# Patient Record
Sex: FEMALE | Race: WHITE | Employment: UNEMPLOYED | URBAN - NONMETROPOLITAN AREA
[De-identification: names, ages, dates, MRNs, and addresses within clinical notes are randomized per-mention and may not be internally consistent; named-entity substitution may affect disease eponyms.]

---

## 2018-05-14 LAB
ABO/RH: NORMAL
ANTIBODY SCREEN: NEGATIVE
CHLAMYDIA TRACHOMATIS AMPLIFIED DET: NEGATIVE
HCT: 36.8 %
HEPATITIS B SURFACE ANTIGEN INTERPRETATION: NEGATIVE
HGB: 12.1
HIV-1 AND HIV-2 ANTIBODIES: NEGATIVE
N GONORRHOEAE AMPLIFIED DET: NEGATIVE
RPR: NORMAL
RUBELLA ANTIBODY IGG: NORMAL

## 2018-10-01 LAB
GLUCOSE TOLERANCE SCREEN 50G: 97
HCT: 32.3 %
HGB: 10

## 2018-10-15 ENCOUNTER — INITIAL PRENATAL (OUTPATIENT)
Dept: OBGYN | Age: 23
End: 2018-10-15
Payer: COMMERCIAL

## 2018-10-15 VITALS
SYSTOLIC BLOOD PRESSURE: 121 MMHG | DIASTOLIC BLOOD PRESSURE: 83 MMHG | HEIGHT: 67 IN | BODY MASS INDEX: 32.33 KG/M2 | HEART RATE: 94 BPM | WEIGHT: 206 LBS

## 2018-10-15 DIAGNOSIS — R12 HEARTBURN DURING PREGNANCY IN THIRD TRIMESTER: ICD-10-CM

## 2018-10-15 DIAGNOSIS — Z34.80 ENCOUNTER FOR SUPERVISION OF NORMAL PREGNANCY IN MULTIGRAVIDA: Primary | ICD-10-CM

## 2018-10-15 DIAGNOSIS — O26.893 HEARTBURN DURING PREGNANCY IN THIRD TRIMESTER: ICD-10-CM

## 2018-10-15 PROBLEM — O26.899 HEARTBURN DURING PREGNANCY: Status: ACTIVE | Noted: 2018-10-15

## 2018-10-15 PROCEDURE — 99213 OFFICE O/P EST LOW 20 MIN: CPT | Performed by: ADVANCED PRACTICE MIDWIFE

## 2018-10-15 NOTE — PATIENT INSTRUCTIONS
sometimes follow orgasm. Learn about  labor  · Watch for signs of  labor. You may be going into labor if:  ¨ You have menstrual-like cramps, with or without nausea. ¨ You have about 6 or more contractions in 1 hour, even after you have had a glass of water and are resting. ¨ You have a low, dull backache that does not go away when you change your position. ¨ You have pain or pressure in your pelvis that comes and goes in a pattern. ¨ You have intestinal cramping or flu-like symptoms, with or without diarrhea. ¨ You notice an increase or change in your vaginal discharge. Discharge may be heavy, mucus-like, watery, or streaked with blood. ¨ Your water breaks. · If you think you have  labor:  ¨ Drink 2 or 3 glasses of water or juice. Not drinking enough fluids can cause contractions. ¨ Stop what you are doing, and empty your bladder. Then lie down on your left side for at least 1 hour. ¨ While lying on your side, find your breast bone. Put your fingers in the soft spot just below it. Move your fingers down toward your belly button to find the top of your uterus. Check to see if it is tight. ¨ Contractions can be weak or strong. Record your contractions for an hour. Time a contraction from the start of one contraction to the start of the next one. ¨ Single or several strong contractions without a pattern are called Bannock-Perez contractions. They are practice contractions but not the start of labor. They often stop if you change what you are doing. ¨ Call your doctor if you have regular contractions. Where can you learn more? Go to https://City Invoice Financechino."Small World Kids, Inc.". org and sign in to your Freepath account. Enter F544 in the Fundamo (Proprietary) box to learn more about \"Weeks 26 to 30 of Your Pregnancy: Care Instructions. \"     If you do not have an account, please click on the \"Sign Up Now\" link.   Current as of: 2017  Content Version: 11.7  © 7040-9681 Healthwise,

## 2018-11-01 ENCOUNTER — ROUTINE PRENATAL (OUTPATIENT)
Dept: OBGYN | Age: 23
End: 2018-11-01
Payer: COMMERCIAL

## 2018-11-01 VITALS
DIASTOLIC BLOOD PRESSURE: 79 MMHG | SYSTOLIC BLOOD PRESSURE: 115 MMHG | HEART RATE: 97 BPM | BODY MASS INDEX: 32.42 KG/M2 | WEIGHT: 207 LBS

## 2018-11-01 DIAGNOSIS — Z34.80 ENCOUNTER FOR SUPERVISION OF NORMAL PREGNANCY IN MULTIGRAVIDA: Primary | ICD-10-CM

## 2018-11-01 PROCEDURE — 99213 OFFICE O/P EST LOW 20 MIN: CPT | Performed by: ADVANCED PRACTICE MIDWIFE

## 2018-11-01 NOTE — PATIENT INSTRUCTIONS
have counted 10 movements, write down your stop time. ¨ Write down how many minutes it took for your baby to move 10 times. ¨ If an hour goes by and you have not recorded 10 movements, have something to eat or drink and then count for another hour. If you do not record 10 movements in either hour, call your doctor. Ease heartburn  · Eat small, frequent meals. · Do not eat chocolate, peppermint, or very spicy foods. Avoid drinks with caffeine, such as coffee, tea, and sodas. · Avoid bending over or lying down after meals. · Talk a short walk after you eat. · If heartburn is a problem at night, do not eat for 2 hours before bedtime. · Take antacids like Mylanta, Maalox, Rolaids, or Tums. Do not take antacids that have sodium bicarbonate. Care for varicose veins  · Varicose veins are blood vessels that stretch out with the extra blood during pregnancy. Your legs may ache or throb. Most varicose veins will go away after the birth. · Avoid standing for long periods of time. Sit with your legs crossed at the ankles, not the knees. · Sit with your feet propped up. · Avoid tight clothing or stockings. Wear support hose. · Exercise regularly. Try walking for at least 30 minutes a day. Where can you learn more? Go to https://Oberon Fuels.Phoenix Books. org and sign in to your [employer] Cambridge Education [employer] account. Enter Y064 in the [facility] box to learn more about \"Weeks 30 to 32 of Your Pregnancy: Care Instructions. \"     If you do not have an account, please click on the \"Sign Up Now\" link. Current as of: November 21, 2017  Content Version: 11.7  © 6346-2213 Rocket Fuel. Care instructions adapted under license by [facility] ([facility]). If you have questions about a medical condition or this instruction, always ask your healthcare professional. [address]  any warranty or liability for your use of this information.

## 2018-11-12 ENCOUNTER — ROUTINE PRENATAL (OUTPATIENT)
Dept: OBGYN | Age: 23
End: 2018-11-12
Payer: COMMERCIAL

## 2018-11-12 ENCOUNTER — HOSPITAL ENCOUNTER (OUTPATIENT)
Age: 23
Setting detail: OBSERVATION
Discharge: HOME | End: 2018-11-12
Attending: ADVANCED PRACTICE MIDWIFE | Admitting: ADVANCED PRACTICE MIDWIFE
Payer: COMMERCIAL

## 2018-11-12 VITALS
BODY MASS INDEX: 31.95 KG/M2 | WEIGHT: 204 LBS | HEART RATE: 109 BPM | DIASTOLIC BLOOD PRESSURE: 77 MMHG | SYSTOLIC BLOOD PRESSURE: 118 MMHG

## 2018-11-12 VITALS
WEIGHT: 206 LBS | HEIGHT: 67 IN | DIASTOLIC BLOOD PRESSURE: 80 MMHG | SYSTOLIC BLOOD PRESSURE: 120 MMHG | BODY MASS INDEX: 32.33 KG/M2 | RESPIRATION RATE: 16 BRPM | HEART RATE: 93 BPM | TEMPERATURE: 98.8 F

## 2018-11-12 DIAGNOSIS — N89.8 VAGINAL DISCHARGE: ICD-10-CM

## 2018-11-12 DIAGNOSIS — Z3A.32 32 WEEKS GESTATION OF PREGNANCY: ICD-10-CM

## 2018-11-12 DIAGNOSIS — Z34.03 SUPERVISION OF NORMAL FIRST PREGNANCY IN THIRD TRIMESTER: Primary | ICD-10-CM

## 2018-11-12 LAB
BACTERIA WET PREP: NORMAL
CLUE CELLS: NORMAL
EPITHELIAL CELLS WET PREP: NORMAL
RBC WET PREP: NORMAL
SOURCE WET PREP: NORMAL
TRICHOMONAS PREP: NORMAL
WBC WET PREP: NORMAL
YEAST WET PREP: NORMAL

## 2018-11-12 PROCEDURE — 99213 OFFICE O/P EST LOW 20 MIN: CPT | Performed by: ADVANCED PRACTICE MIDWIFE

## 2018-11-12 PROCEDURE — G0379 DIRECT REFER HOSPITAL OBSERV: HCPCS

## 2018-11-12 PROCEDURE — G0378 HOSPITAL OBSERVATION PER HR: HCPCS

## 2018-11-12 RX ORDER — PANTOPRAZOLE SODIUM 20 MG/1
TABLET, DELAYED RELEASE ORAL
Refills: 2 | COMMUNITY
Start: 2018-09-10 | End: 2019-01-16

## 2018-11-12 RX ORDER — SODIUM CHLORIDE 0.9 % (FLUSH) 0.9 %
10 SYRINGE (ML) INJECTION EVERY 12 HOURS SCHEDULED
Status: DISCONTINUED | OUTPATIENT
Start: 2018-11-12 | End: 2018-11-12 | Stop reason: HOSPADM

## 2018-11-12 RX ORDER — ACETAMINOPHEN 325 MG/1
650 TABLET ORAL EVERY 4 HOURS PRN
Status: DISCONTINUED | OUTPATIENT
Start: 2018-11-12 | End: 2018-11-12 | Stop reason: HOSPADM

## 2018-11-12 RX ORDER — SODIUM CHLORIDE 0.9 % (FLUSH) 0.9 %
10 SYRINGE (ML) INJECTION PRN
Status: DISCONTINUED | OUTPATIENT
Start: 2018-11-12 | End: 2018-11-12 | Stop reason: HOSPADM

## 2018-11-12 RX ORDER — ONDANSETRON 2 MG/ML
4 INJECTION INTRAMUSCULAR; INTRAVENOUS EVERY 6 HOURS PRN
Status: DISCONTINUED | OUTPATIENT
Start: 2018-11-12 | End: 2018-11-12 | Stop reason: HOSPADM

## 2018-11-12 NOTE — PROGRESS NOTES
Amy presents as walk in for \"leaking fluid\". Neg speculum exam, neg amniosure, +wet prep collected. She was sent to [facility] for monitoring due to fetal tachycardia. She was also asked to keep her f/u appointment on Thursday of this week. All questions answered and reassurance given.

## 2018-11-15 ENCOUNTER — ROUTINE PRENATAL (OUTPATIENT)
Dept: OBGYN | Age: 23
End: 2018-11-15
Payer: COMMERCIAL

## 2018-11-15 VITALS
BODY MASS INDEX: 32.11 KG/M2 | DIASTOLIC BLOOD PRESSURE: 78 MMHG | SYSTOLIC BLOOD PRESSURE: 116 MMHG | WEIGHT: 205 LBS | HEART RATE: 119 BPM

## 2018-11-15 DIAGNOSIS — O26.893 HEARTBURN DURING PREGNANCY IN THIRD TRIMESTER: ICD-10-CM

## 2018-11-15 DIAGNOSIS — R12 HEARTBURN DURING PREGNANCY IN THIRD TRIMESTER: ICD-10-CM

## 2018-11-15 DIAGNOSIS — O99.213 OBESITY AFFECTING PREGNANCY IN THIRD TRIMESTER: Primary | ICD-10-CM

## 2018-11-15 PROBLEM — Z3A.32 32 WEEKS GESTATION OF PREGNANCY: Status: RESOLVED | Noted: 2018-11-12 | Resolved: 2018-11-15

## 2018-11-15 PROCEDURE — 99213 OFFICE O/P EST LOW 20 MIN: CPT | Performed by: ADVANCED PRACTICE MIDWIFE

## 2018-11-15 NOTE — PROGRESS NOTES
Viraj Eduardo is here for a return obstetrical visit. Today she is 32w3d weeks EGA. She is doing well and has no complaints. Precautions reviewed. Continue with routine prenatal care.

## 2018-11-24 ENCOUNTER — HOSPITAL ENCOUNTER (OUTPATIENT)
Age: 23
Setting detail: OBSERVATION
Discharge: HOME | End: 2018-11-24
Attending: ADVANCED PRACTICE MIDWIFE | Admitting: ADVANCED PRACTICE MIDWIFE
Payer: COMMERCIAL

## 2018-11-24 VITALS
WEIGHT: 206 LBS | SYSTOLIC BLOOD PRESSURE: 119 MMHG | DIASTOLIC BLOOD PRESSURE: 82 MMHG | HEART RATE: 102 BPM | HEIGHT: 67 IN | BODY MASS INDEX: 32.33 KG/M2 | TEMPERATURE: 98 F

## 2018-11-24 PROBLEM — R52 PAIN: Status: ACTIVE | Noted: 2018-11-24

## 2018-11-24 LAB
BACTERIA: ABNORMAL /HPF
BILIRUBIN URINE: NEGATIVE
BLOOD, URINE: NEGATIVE
CLARITY: ABNORMAL
COLOR: YELLOW
EPITHELIAL CELLS, UA: 26 /HPF (ref 0–5)
GLUCOSE URINE: NEGATIVE MG/DL
HYALINE CASTS: 10 /HPF (ref 0–8)
KETONES, URINE: NEGATIVE MG/DL
LEUKOCYTE ESTERASE, URINE: ABNORMAL
NITRITE, URINE: NEGATIVE
PH UA: 7
PROTEIN UA: NEGATIVE MG/DL
RBC UA: 1 /HPF (ref 0–4)
SPECIFIC GRAVITY UA: 1.02
UROBILINOGEN, URINE: 1 E.U./DL
WBC UA: 7 /HPF (ref 0–5)

## 2018-11-24 PROCEDURE — G0379 DIRECT REFER HOSPITAL OBSERV: HCPCS

## 2018-11-24 PROCEDURE — G0378 HOSPITAL OBSERVATION PER HR: HCPCS

## 2018-11-24 PROCEDURE — 96372 THER/PROPH/DIAG INJ SC/IM: CPT

## 2018-11-24 PROCEDURE — 81001 URINALYSIS AUTO W/SCOPE: CPT

## 2018-11-24 PROCEDURE — 2500000003 HC RX 250 WO HCPCS: Performed by: ADVANCED PRACTICE MIDWIFE

## 2018-11-24 PROCEDURE — 6360000002 HC RX W HCPCS: Performed by: ADVANCED PRACTICE MIDWIFE

## 2018-11-24 RX ORDER — LIDOCAINE HYDROCHLORIDE 10 MG/ML
2.1 INJECTION, SOLUTION EPIDURAL; INFILTRATION; INTRACAUDAL; PERINEURAL ONCE
Status: COMPLETED | OUTPATIENT
Start: 2018-11-24 | End: 2018-11-24

## 2018-11-24 RX ORDER — ONDANSETRON 2 MG/ML
4 INJECTION INTRAMUSCULAR; INTRAVENOUS EVERY 6 HOURS PRN
Status: DISCONTINUED | OUTPATIENT
Start: 2018-11-24 | End: 2018-11-24 | Stop reason: HOSPADM

## 2018-11-24 RX ORDER — CEFTRIAXONE 1 G/1
1 INJECTION, POWDER, FOR SOLUTION INTRAMUSCULAR; INTRAVENOUS ONCE
Status: COMPLETED | OUTPATIENT
Start: 2018-11-24 | End: 2018-11-24

## 2018-11-24 RX ORDER — ACETAMINOPHEN 325 MG/1
650 TABLET ORAL EVERY 4 HOURS PRN
Status: DISCONTINUED | OUTPATIENT
Start: 2018-11-24 | End: 2018-11-24 | Stop reason: HOSPADM

## 2018-11-24 RX ORDER — CEFTRIAXONE 1 G/1
1 INJECTION, POWDER, FOR SOLUTION INTRAMUSCULAR; INTRAVENOUS ONCE
Status: DISCONTINUED | OUTPATIENT
Start: 2018-11-24 | End: 2018-11-24 | Stop reason: CLARIF

## 2018-11-24 RX ADMIN — CEFTRIAXONE 1 G: 1 INJECTION, POWDER, FOR SOLUTION INTRAMUSCULAR; INTRAVENOUS at 13:39

## 2018-11-24 RX ADMIN — LIDOCAINE HYDROCHLORIDE 2.1 ML: 10 INJECTION, SOLUTION EPIDURAL; INFILTRATION; INTRACAUDAL; PERINEURAL at 13:40

## 2018-11-29 ENCOUNTER — ROUTINE PRENATAL (OUTPATIENT)
Dept: OBGYN | Age: 23
End: 2018-11-29
Payer: COMMERCIAL

## 2018-11-29 ENCOUNTER — HOSPITAL ENCOUNTER (OUTPATIENT)
Dept: ULTRASOUND IMAGING | Age: 23
Discharge: HOME | End: 2018-11-29
Payer: COMMERCIAL

## 2018-11-29 VITALS
SYSTOLIC BLOOD PRESSURE: 126 MMHG | HEART RATE: 121 BPM | WEIGHT: 209 LBS | DIASTOLIC BLOOD PRESSURE: 84 MMHG | BODY MASS INDEX: 32.73 KG/M2

## 2018-11-29 DIAGNOSIS — R12 HEARTBURN DURING PREGNANCY IN THIRD TRIMESTER: ICD-10-CM

## 2018-11-29 DIAGNOSIS — O26.893 HEARTBURN DURING PREGNANCY IN THIRD TRIMESTER: ICD-10-CM

## 2018-11-29 DIAGNOSIS — Z34.03 SUPERVISION OF NORMAL FIRST PREGNANCY IN THIRD TRIMESTER: ICD-10-CM

## 2018-11-29 DIAGNOSIS — O99.213 OBESITY AFFECTING PREGNANCY IN THIRD TRIMESTER: ICD-10-CM

## 2018-11-29 DIAGNOSIS — O99.213 OBESITY AFFECTING PREGNANCY IN THIRD TRIMESTER: Primary | ICD-10-CM

## 2018-11-29 PROCEDURE — 99213 OFFICE O/P EST LOW 20 MIN: CPT | Performed by: ADVANCED PRACTICE MIDWIFE

## 2018-11-29 PROCEDURE — 76816 OB US FOLLOW-UP PER FETUS: CPT

## 2018-11-29 NOTE — PROGRESS NOTES
Katy Carrion is here for a return obstetrical visit. Today she is 34w3d weeks EGA. She is doing well and has no complaints. She  does not have vaginal bleeding, leaking of fluid, contractions. She does not have blurred vision, SOB, or increased swelling in legs or face. Pt does feel fetal movement regularly. EFW today 6#1. O:   Vitals:    11/29/18 1446   BP: 126/84   Pulse: 121   Weight: 209 lb (94.8 kg)     Pt is A&Ox3, in no acute distress. Normocephalic, atraumatic. PERRL. Resp even and non-labored. Skin pink, warm & dry. Gravid abdomen. FRITZ's well. Gait steady. See OB flowsheet. A: Normal IUP at 34w3d wks      Diagnosis Orders   1. Obesity affecting pregnancy in third trimester     2. Supervision of normal first pregnancy in third trimester         P:   Pt counseled on [address], warning signs, PTL, Preeclampsia  Continue with routine prenatal care. RTC in 2 wk for prenatal visit  GBS next visit    MEDICATIONS:  No orders of the defined types were placed in this encounter. ORDERS:  No orders of the defined types were placed in this encounter.

## 2018-12-13 ENCOUNTER — ROUTINE PRENATAL (OUTPATIENT)
Dept: OBGYN | Age: 23
End: 2018-12-13
Payer: COMMERCIAL

## 2018-12-13 VITALS
DIASTOLIC BLOOD PRESSURE: 81 MMHG | WEIGHT: 216 LBS | BODY MASS INDEX: 33.83 KG/M2 | SYSTOLIC BLOOD PRESSURE: 130 MMHG | HEART RATE: 124 BPM

## 2018-12-13 DIAGNOSIS — Z36.85 ANTENATAL SCREENING FOR STREPTOCOCCUS B: ICD-10-CM

## 2018-12-13 DIAGNOSIS — Z3A.36 36 WEEKS GESTATION OF PREGNANCY: ICD-10-CM

## 2018-12-13 DIAGNOSIS — M53.3 ACUTE COCCYGEAL PAIN: ICD-10-CM

## 2018-12-13 DIAGNOSIS — Z34.03 SUPERVISION OF NORMAL FIRST PREGNANCY IN THIRD TRIMESTER: Primary | ICD-10-CM

## 2018-12-13 PROCEDURE — 99213 OFFICE O/P EST LOW 20 MIN: CPT | Performed by: ADVANCED PRACTICE MIDWIFE

## 2018-12-16 LAB — GROUP B STREP CULTURE: NORMAL

## 2018-12-20 ENCOUNTER — ROUTINE PRENATAL (OUTPATIENT)
Dept: OBGYN | Age: 23
End: 2018-12-20
Payer: COMMERCIAL

## 2018-12-20 VITALS
HEART RATE: 116 BPM | DIASTOLIC BLOOD PRESSURE: 81 MMHG | SYSTOLIC BLOOD PRESSURE: 133 MMHG | BODY MASS INDEX: 33.83 KG/M2 | WEIGHT: 216 LBS

## 2018-12-20 DIAGNOSIS — Z34.03 SUPERVISION OF NORMAL FIRST PREGNANCY IN THIRD TRIMESTER: ICD-10-CM

## 2018-12-20 DIAGNOSIS — Z71.89 ENCOUNTER FOR ANTEPARTUM CONSULTATION REGARDING LACTATION: Primary | ICD-10-CM

## 2018-12-20 DIAGNOSIS — M53.3 ACUTE COCCYGEAL PAIN: ICD-10-CM

## 2018-12-20 PROCEDURE — G8484 FLU IMMUNIZE NO ADMIN: HCPCS | Performed by: ADVANCED PRACTICE MIDWIFE

## 2018-12-20 PROCEDURE — G8417 CALC BMI ABV UP PARAM F/U: HCPCS | Performed by: ADVANCED PRACTICE MIDWIFE

## 2018-12-20 PROCEDURE — 1036F TOBACCO NON-USER: CPT | Performed by: ADVANCED PRACTICE MIDWIFE

## 2018-12-20 PROCEDURE — 99213 OFFICE O/P EST LOW 20 MIN: CPT | Performed by: ADVANCED PRACTICE MIDWIFE

## 2018-12-20 PROCEDURE — G8427 DOCREV CUR MEDS BY ELIG CLIN: HCPCS | Performed by: ADVANCED PRACTICE MIDWIFE

## 2018-12-20 RX ORDER — CYCLOBENZAPRINE HCL 5 MG
TABLET ORAL
Refills: 1 | COMMUNITY
Start: 2018-11-30 | End: 2019-01-16

## 2018-12-24 PROBLEM — R52 PAIN: Status: RESOLVED | Noted: 2018-11-24 | Resolved: 2018-12-24

## 2018-12-26 ENCOUNTER — ROUTINE PRENATAL (OUTPATIENT)
Dept: OBGYN | Age: 23
End: 2018-12-26
Payer: COMMERCIAL

## 2018-12-26 VITALS
WEIGHT: 215 LBS | BODY MASS INDEX: 33.67 KG/M2 | HEART RATE: 87 BPM | DIASTOLIC BLOOD PRESSURE: 84 MMHG | SYSTOLIC BLOOD PRESSURE: 136 MMHG

## 2018-12-26 DIAGNOSIS — M53.3 ACUTE COCCYGEAL PAIN: ICD-10-CM

## 2018-12-26 DIAGNOSIS — Z34.03 SUPERVISION OF NORMAL FIRST PREGNANCY IN THIRD TRIMESTER: Primary | ICD-10-CM

## 2018-12-26 PROCEDURE — G8417 CALC BMI ABV UP PARAM F/U: HCPCS | Performed by: ADVANCED PRACTICE MIDWIFE

## 2018-12-26 PROCEDURE — G8484 FLU IMMUNIZE NO ADMIN: HCPCS | Performed by: ADVANCED PRACTICE MIDWIFE

## 2018-12-26 PROCEDURE — G8427 DOCREV CUR MEDS BY ELIG CLIN: HCPCS | Performed by: ADVANCED PRACTICE MIDWIFE

## 2018-12-26 PROCEDURE — 1036F TOBACCO NON-USER: CPT | Performed by: ADVANCED PRACTICE MIDWIFE

## 2018-12-26 PROCEDURE — 99213 OFFICE O/P EST LOW 20 MIN: CPT | Performed by: ADVANCED PRACTICE MIDWIFE

## 2018-12-26 NOTE — PATIENT INSTRUCTIONS
[email or web address] and sign in to your Solar Site Design account. Enter B044 in the [facility] box to learn more about \"Week 38 of Your Pregnancy: Care Instructions. \"     If you do not have an account, please click on the \"Sign Up Now\" link. Current as of: November 21, 2017  Content Version: 11.8  © 4841-9427 Healthwise, [place]. Care instructions adapted under license by [facility] ([facility]). If you have questions about a medical condition or this instruction, always ask your healthcare professional. [place] [address] any warranty or liability for your use of this information.

## 2018-12-28 PROBLEM — Z71.89 ENCOUNTER FOR ANTEPARTUM CONSULTATION REGARDING LACTATION: Status: RESOLVED | Noted: 2018-12-20 | Resolved: 2018-12-28

## 2018-12-31 ENCOUNTER — ROUTINE PRENATAL (OUTPATIENT)
Dept: OBGYN | Age: 23
End: 2018-12-31
Payer: COMMERCIAL

## 2018-12-31 VITALS
HEART RATE: 131 BPM | BODY MASS INDEX: 33.99 KG/M2 | SYSTOLIC BLOOD PRESSURE: 121 MMHG | DIASTOLIC BLOOD PRESSURE: 86 MMHG | WEIGHT: 217 LBS

## 2018-12-31 DIAGNOSIS — Z34.80 ENCOUNTER FOR SUPERVISION OF NORMAL PREGNANCY IN MULTIGRAVIDA: ICD-10-CM

## 2018-12-31 DIAGNOSIS — M53.3 ACUTE COCCYGEAL PAIN: Primary | ICD-10-CM

## 2018-12-31 DIAGNOSIS — R12 HEARTBURN DURING PREGNANCY IN THIRD TRIMESTER: ICD-10-CM

## 2018-12-31 DIAGNOSIS — O26.893 HEARTBURN DURING PREGNANCY IN THIRD TRIMESTER: ICD-10-CM

## 2018-12-31 DIAGNOSIS — O99.213 OBESITY AFFECTING PREGNANCY IN THIRD TRIMESTER: ICD-10-CM

## 2018-12-31 PROCEDURE — G8427 DOCREV CUR MEDS BY ELIG CLIN: HCPCS | Performed by: ADVANCED PRACTICE MIDWIFE

## 2018-12-31 PROCEDURE — 99213 OFFICE O/P EST LOW 20 MIN: CPT | Performed by: ADVANCED PRACTICE MIDWIFE

## 2018-12-31 PROCEDURE — 1036F TOBACCO NON-USER: CPT | Performed by: ADVANCED PRACTICE MIDWIFE

## 2018-12-31 PROCEDURE — G8484 FLU IMMUNIZE NO ADMIN: HCPCS | Performed by: ADVANCED PRACTICE MIDWIFE

## 2018-12-31 PROCEDURE — G8417 CALC BMI ABV UP PARAM F/U: HCPCS | Performed by: ADVANCED PRACTICE MIDWIFE

## 2018-12-31 NOTE — LETTER
reasonable alternatives and risks of each alternative for delivery. The possible risks of refusing medically indicated delivery, including death of myself and/or my baby have been explained to me. Patient Signature_________________________ Date ____________ Time__________    [place] _________________________ Date ___________ Time_________    Provider Attestation  I have discussed with the patient risks, benefits and alternatives (and relevant risks, benefits, and side effects related to alternative or not receiving care), and likelihood of the success of the delivery. Separate surgical consent form will be completed prior to  section.      Provider Signature ________________________ Date ____________ Time__________

## 2019-01-01 ENCOUNTER — APPOINTMENT (OUTPATIENT)
Dept: LABOR AND DELIVERY | Age: 24
End: 2019-01-01
Payer: COMMERCIAL

## 2019-01-01 ENCOUNTER — HOSPITAL ENCOUNTER (INPATIENT)
Age: 24
LOS: 2 days | Discharge: HOME | End: 2019-01-03
Attending: ADVANCED PRACTICE MIDWIFE | Admitting: ADVANCED PRACTICE MIDWIFE
Payer: COMMERCIAL

## 2019-01-01 PROBLEM — Z37.9 NORMAL LABOR: Status: ACTIVE | Noted: 2019-01-01

## 2019-01-01 LAB
ABO/RH: NORMAL
AMPHETAMINE SCREEN, URINE: NEGATIVE
ANTIBODY SCREEN: NORMAL
BACTERIA: NORMAL /HPF
BARBITURATE SCREEN URINE: NEGATIVE
BENZODIAZEPINE SCREEN, URINE: NEGATIVE
BILIRUBIN URINE: NEGATIVE
BLOOD, URINE: NEGATIVE
CANNABINOID SCREEN URINE: NEGATIVE
CLARITY: ABNORMAL
COCAINE METABOLITE SCREEN URINE: NEGATIVE
COLOR: ABNORMAL
EPITHELIAL CELLS, UA: NORMAL /HPF
GLUCOSE URINE: NEGATIVE MG/DL
HCT VFR BLD CALC: 29.8 % (ref 37–47)
HEMOGLOBIN: 8.7 G/DL (ref 12–16)
KETONES, URINE: NEGATIVE MG/DL
LEUKOCYTE ESTERASE, URINE: ABNORMAL
Lab: NORMAL
MCH RBC QN AUTO: 23.8 PG (ref 27–31)
MCHC RBC AUTO-ENTMCNC: 29.2 G/DL (ref 33–37)
MCV RBC AUTO: 81.6 FL (ref 81–99)
NITRITE, URINE: NEGATIVE
OPIATE SCREEN URINE: NEGATIVE
PDW BLD-RTO: 16.7 % (ref 11.5–14.5)
PH UA: 6
PLATELET # BLD: 272 K/UL (ref 130–400)
PMV BLD AUTO: 11.8 FL (ref 9.4–12.3)
PROTEIN UA: 30 MG/DL
RBC # BLD: 3.65 M/UL (ref 4.2–5.4)
RBC UA: NORMAL /HPF (ref 0–2)
SPECIFIC GRAVITY UA: 1.02
UROBILINOGEN, URINE: 0.2 E.U./DL
WBC # BLD: 9.2 K/UL (ref 4.8–10.8)
WBC UA: NORMAL /HPF (ref 0–5)

## 2019-01-01 PROCEDURE — 86592 SYPHILIS TEST NON-TREP QUAL: CPT

## 2019-01-01 PROCEDURE — 84112 EVAL AMNIOTIC FLUID PROTEIN: CPT

## 2019-01-01 PROCEDURE — 86901 BLOOD TYPING SEROLOGIC RH(D): CPT

## 2019-01-01 PROCEDURE — 2580000003 HC RX 258: Performed by: ADVANCED PRACTICE MIDWIFE

## 2019-01-01 PROCEDURE — 1220000000 HC SEMI PRIVATE OB R&B

## 2019-01-01 PROCEDURE — 6360000002 HC RX W HCPCS: Performed by: ADVANCED PRACTICE MIDWIFE

## 2019-01-01 PROCEDURE — 86900 BLOOD TYPING SEROLOGIC ABO: CPT

## 2019-01-01 PROCEDURE — 36415 COLL VENOUS BLD VENIPUNCTURE: CPT

## 2019-01-01 PROCEDURE — 86850 RBC ANTIBODY SCREEN: CPT

## 2019-01-01 PROCEDURE — 81001 URINALYSIS AUTO W/SCOPE: CPT

## 2019-01-01 PROCEDURE — 3E0P7VZ INTRODUCTION OF HORMONE INTO FEMALE REPRODUCTIVE, VIA NATURAL OR ARTIFICIAL OPENING: ICD-10-PCS | Performed by: ADVANCED PRACTICE MIDWIFE

## 2019-01-01 PROCEDURE — 80307 DRUG TEST PRSMV CHEM ANLYZR: CPT

## 2019-01-01 PROCEDURE — 85027 COMPLETE CBC AUTOMATED: CPT

## 2019-01-01 PROCEDURE — 6370000000 HC RX 637 (ALT 250 FOR IP): Performed by: ADVANCED PRACTICE MIDWIFE

## 2019-01-01 PROCEDURE — 4A1HXCZ MONITORING OF PRODUCTS OF CONCEPTION, CARDIAC RATE, EXTERNAL APPROACH: ICD-10-PCS | Performed by: ADVANCED PRACTICE MIDWIFE

## 2019-01-01 RX ORDER — SODIUM CHLORIDE, SODIUM LACTATE, POTASSIUM CHLORIDE, CALCIUM CHLORIDE 600; 310; 30; 20 MG/100ML; MG/100ML; MG/100ML; MG/100ML
INJECTION, SOLUTION INTRAVENOUS CONTINUOUS
Status: DISCONTINUED | OUTPATIENT
Start: 2019-01-01 | End: 2019-01-02

## 2019-01-01 RX ORDER — LIDOCAINE HYDROCHLORIDE 10 MG/ML
30 INJECTION, SOLUTION EPIDURAL; INFILTRATION; INTRACAUDAL; PERINEURAL PRN
Status: DISCONTINUED | OUTPATIENT
Start: 2019-01-01 | End: 2019-01-02

## 2019-01-01 RX ORDER — ZOLPIDEM TARTRATE 5 MG/1
10 TABLET ORAL NIGHTLY PRN
Status: DISCONTINUED | OUTPATIENT
Start: 2019-01-01 | End: 2019-01-02

## 2019-01-01 RX ORDER — BISACODYL 10 MG
10 SUPPOSITORY, RECTAL RECTAL DAILY PRN
Status: DISCONTINUED | OUTPATIENT
Start: 2019-01-01 | End: 2019-01-02

## 2019-01-01 RX ORDER — BUTORPHANOL TARTRATE 1 MG/ML
1 INJECTION, SOLUTION INTRAMUSCULAR; INTRAVENOUS
Status: DISCONTINUED | OUTPATIENT
Start: 2019-01-01 | End: 2019-01-02

## 2019-01-01 RX ORDER — SODIUM CHLORIDE, SODIUM LACTATE, POTASSIUM CHLORIDE, AND CALCIUM CHLORIDE .6; .31; .03; .02 G/100ML; G/100ML; G/100ML; G/100ML
1000 INJECTION, SOLUTION INTRAVENOUS
Status: ACTIVE | OUTPATIENT
Start: 2019-01-01 | End: 2019-01-01

## 2019-01-01 RX ORDER — DOCUSATE SODIUM 100 MG/1
100 CAPSULE, LIQUID FILLED ORAL 2 TIMES DAILY
Status: DISCONTINUED | OUTPATIENT
Start: 2019-01-01 | End: 2019-01-02

## 2019-01-01 RX ORDER — ONDANSETRON 2 MG/ML
8 INJECTION INTRAMUSCULAR; INTRAVENOUS EVERY 8 HOURS PRN
Status: DISCONTINUED | OUTPATIENT
Start: 2019-01-01 | End: 2019-01-02

## 2019-01-01 RX ORDER — PROMETHAZINE HYDROCHLORIDE 25 MG/ML
12.5 INJECTION, SOLUTION INTRAMUSCULAR; INTRAVENOUS EVERY 4 HOURS PRN
Status: DISCONTINUED | OUTPATIENT
Start: 2019-01-01 | End: 2019-01-02

## 2019-01-01 RX ORDER — SODIUM CHLORIDE 0.9 % (FLUSH) 0.9 %
10 SYRINGE (ML) INJECTION EVERY 12 HOURS SCHEDULED
Status: DISCONTINUED | OUTPATIENT
Start: 2019-01-01 | End: 2019-01-02

## 2019-01-01 RX ORDER — SODIUM CHLORIDE 0.9 % (FLUSH) 0.9 %
10 SYRINGE (ML) INJECTION PRN
Status: DISCONTINUED | OUTPATIENT
Start: 2019-01-01 | End: 2019-01-02

## 2019-01-01 RX ADMIN — Medication 50 MCG: at 17:53

## 2019-01-01 RX ADMIN — Medication 50 MCG: at 22:36

## 2019-01-01 RX ADMIN — ZOLPIDEM TARTRATE 10 MG: 5 TABLET ORAL at 22:11

## 2019-01-01 RX ADMIN — BUTORPHANOL TARTRATE 1 MG: 1 INJECTION, SOLUTION INTRAMUSCULAR; INTRAVENOUS at 22:23

## 2019-01-01 RX ADMIN — Medication 10 ML: at 23:52

## 2019-01-01 RX ADMIN — SODIUM CHLORIDE, POTASSIUM CHLORIDE, SODIUM LACTATE AND CALCIUM CHLORIDE: 600; 310; 30; 20 INJECTION, SOLUTION INTRAVENOUS at 17:29

## 2019-01-01 RX ADMIN — PROMETHAZINE HYDROCHLORIDE 12.5 MG: 25 INJECTION INTRAMUSCULAR; INTRAVENOUS at 23:51

## 2019-01-01 ASSESSMENT — PAIN SCALES - GENERAL: PAINLEVEL_OUTOF10: 5

## 2019-01-02 ENCOUNTER — ANESTHESIA EVENT (OUTPATIENT)
Dept: MOTHER INFANT UNIT | Age: 24
End: 2019-01-02
Payer: COMMERCIAL

## 2019-01-02 ENCOUNTER — ANESTHESIA (OUTPATIENT)
Dept: MOTHER INFANT UNIT | Age: 24
End: 2019-01-02
Payer: COMMERCIAL

## 2019-01-02 PROCEDURE — 1220000000 HC SEMI PRIVATE OB R&B

## 2019-01-02 PROCEDURE — 3700000025 ANESTHESIA EPIDURAL BLOCK: Performed by: NURSE ANESTHETIST, CERTIFIED REGISTERED

## 2019-01-02 PROCEDURE — 0HQ9XZZ REPAIR PERINEUM SKIN, EXTERNAL APPROACH: ICD-10-PCS | Performed by: ADVANCED PRACTICE MIDWIFE

## 2019-01-02 PROCEDURE — 6370000000 HC RX 637 (ALT 250 FOR IP): Performed by: ADVANCED PRACTICE MIDWIFE

## 2019-01-02 PROCEDURE — 6360000002 HC RX W HCPCS: Performed by: ADVANCED PRACTICE MIDWIFE

## 2019-01-02 PROCEDURE — 7200000001 HC VAGINAL DELIVERY

## 2019-01-02 PROCEDURE — 6360000002 HC RX W HCPCS: Performed by: NURSE ANESTHETIST, CERTIFIED REGISTERED

## 2019-01-02 PROCEDURE — 59409 OBSTETRICAL CARE: CPT | Performed by: ADVANCED PRACTICE MIDWIFE

## 2019-01-02 PROCEDURE — 2580000003 HC RX 258: Performed by: ADVANCED PRACTICE MIDWIFE

## 2019-01-02 RX ORDER — ROPIVACAINE HYDROCHLORIDE 2 MG/ML
INJECTION, SOLUTION EPIDURAL; INFILTRATION; PERINEURAL PRN
Status: DISCONTINUED | OUTPATIENT
Start: 2019-01-02 | End: 2019-01-02 | Stop reason: SDUPTHER

## 2019-01-02 RX ORDER — SODIUM CHLORIDE 0.9 % (FLUSH) 0.9 %
10 SYRINGE (ML) INJECTION PRN
Status: DISCONTINUED | OUTPATIENT
Start: 2019-01-02 | End: 2019-01-04 | Stop reason: HOSPADM

## 2019-01-02 RX ORDER — METHYLERGONOVINE MALEATE 0.2 MG/ML
200 INJECTION INTRAVENOUS PRN
Status: DISCONTINUED | OUTPATIENT
Start: 2019-01-02 | End: 2019-01-04 | Stop reason: HOSPADM

## 2019-01-02 RX ORDER — LANOLIN 100 %
OINTMENT (GRAM) TOPICAL PRN
Status: DISCONTINUED | OUTPATIENT
Start: 2019-01-02 | End: 2019-01-04 | Stop reason: HOSPADM

## 2019-01-02 RX ORDER — FENTANYL CITRATE 50 UG/ML
INJECTION, SOLUTION INTRAMUSCULAR; INTRAVENOUS PRN
Status: DISCONTINUED | OUTPATIENT
Start: 2019-01-02 | End: 2019-01-02 | Stop reason: SDUPTHER

## 2019-01-02 RX ORDER — ROPIVACAINE HYDROCHLORIDE 2 MG/ML
INJECTION, SOLUTION EPIDURAL; INFILTRATION; PERINEURAL CONTINUOUS PRN
Status: DISCONTINUED | OUTPATIENT
Start: 2019-01-02 | End: 2019-01-02 | Stop reason: SDUPTHER

## 2019-01-02 RX ORDER — HYDROCODONE BITARTRATE AND ACETAMINOPHEN 5; 325 MG/1; MG/1
1 TABLET ORAL EVERY 4 HOURS PRN
Status: DISCONTINUED | OUTPATIENT
Start: 2019-01-02 | End: 2019-01-04 | Stop reason: HOSPADM

## 2019-01-02 RX ORDER — DOCUSATE SODIUM 100 MG/1
100 CAPSULE, LIQUID FILLED ORAL 2 TIMES DAILY
Status: DISCONTINUED | OUTPATIENT
Start: 2019-01-02 | End: 2019-01-04 | Stop reason: HOSPADM

## 2019-01-02 RX ORDER — NALOXONE HYDROCHLORIDE 0.4 MG/ML
0.4 INJECTION, SOLUTION INTRAMUSCULAR; INTRAVENOUS; SUBCUTANEOUS PRN
Status: DISCONTINUED | OUTPATIENT
Start: 2019-01-02 | End: 2019-01-02

## 2019-01-02 RX ORDER — SODIUM CHLORIDE, SODIUM LACTATE, POTASSIUM CHLORIDE, CALCIUM CHLORIDE 600; 310; 30; 20 MG/100ML; MG/100ML; MG/100ML; MG/100ML
INJECTION, SOLUTION INTRAVENOUS CONTINUOUS
Status: DISCONTINUED | OUTPATIENT
Start: 2019-01-02 | End: 2019-01-04 | Stop reason: HOSPADM

## 2019-01-02 RX ORDER — SODIUM CHLORIDE 0.9 % (FLUSH) 0.9 %
10 SYRINGE (ML) INJECTION EVERY 12 HOURS SCHEDULED
Status: DISCONTINUED | OUTPATIENT
Start: 2019-01-02 | End: 2019-01-04 | Stop reason: HOSPADM

## 2019-01-02 RX ORDER — IBUPROFEN 400 MG/1
800 TABLET ORAL EVERY 8 HOURS
Status: DISCONTINUED | OUTPATIENT
Start: 2019-01-02 | End: 2019-01-04 | Stop reason: HOSPADM

## 2019-01-02 RX ORDER — ONDANSETRON 2 MG/ML
4 INJECTION INTRAMUSCULAR; INTRAVENOUS EVERY 6 HOURS PRN
Status: DISCONTINUED | OUTPATIENT
Start: 2019-01-02 | End: 2019-01-02

## 2019-01-02 RX ORDER — HYDROCODONE BITARTRATE AND ACETAMINOPHEN 5; 325 MG/1; MG/1
2 TABLET ORAL EVERY 4 HOURS PRN
Status: DISCONTINUED | OUTPATIENT
Start: 2019-01-02 | End: 2019-01-04 | Stop reason: HOSPADM

## 2019-01-02 RX ORDER — ONDANSETRON 2 MG/ML
4 INJECTION INTRAMUSCULAR; INTRAVENOUS EVERY 6 HOURS PRN
Status: DISCONTINUED | OUTPATIENT
Start: 2019-01-02 | End: 2019-01-04 | Stop reason: HOSPADM

## 2019-01-02 RX ADMIN — FENTANYL CITRATE 100 MCG: 50 INJECTION INTRAMUSCULAR; INTRAVENOUS at 02:22

## 2019-01-02 RX ADMIN — IRON SUCROSE 100 MG: 20 INJECTION, SOLUTION INTRAVENOUS at 13:11

## 2019-01-02 RX ADMIN — ROPIVACAINE HYDROCHLORIDE 10 ML/HR: 2 INJECTION, SOLUTION EPIDURAL; INFILTRATION at 02:15

## 2019-01-02 RX ADMIN — Medication 1 MILLI-UNITS/MIN: at 02:46

## 2019-01-02 RX ADMIN — ROPIVACAINE HYDROCHLORIDE 10 ML: 2 INJECTION, SOLUTION EPIDURAL; INFILTRATION at 02:15

## 2019-01-02 RX ADMIN — HYDROCODONE BITARTRATE AND ACETAMINOPHEN 1 TABLET: 5; 325 TABLET ORAL at 13:10

## 2019-01-02 RX ADMIN — IBUPROFEN 800 MG: 400 TABLET ORAL at 13:09

## 2019-01-02 RX ADMIN — HYDROCODONE BITARTRATE AND ACETAMINOPHEN 1 TABLET: 5; 325 TABLET ORAL at 22:52

## 2019-01-02 RX ADMIN — IBUPROFEN 800 MG: 400 TABLET ORAL at 04:32

## 2019-01-02 RX ADMIN — SODIUM CHLORIDE, POTASSIUM CHLORIDE, SODIUM LACTATE AND CALCIUM CHLORIDE: 600; 310; 30; 20 INJECTION, SOLUTION INTRAVENOUS at 01:12

## 2019-01-02 RX ADMIN — SODIUM CHLORIDE, POTASSIUM CHLORIDE, SODIUM LACTATE AND CALCIUM CHLORIDE: 600; 310; 30; 20 INJECTION, SOLUTION INTRAVENOUS at 00:08

## 2019-01-02 RX ADMIN — BUTORPHANOL TARTRATE 1 MG: 1 INJECTION, SOLUTION INTRAMUSCULAR; INTRAVENOUS at 00:21

## 2019-01-02 RX ADMIN — SODIUM CHLORIDE, POTASSIUM CHLORIDE, SODIUM LACTATE AND CALCIUM CHLORIDE: 600; 310; 30; 20 INJECTION, SOLUTION INTRAVENOUS at 02:45

## 2019-01-02 ASSESSMENT — PAIN SCALES - GENERAL
PAINLEVEL_OUTOF10: 1
PAINLEVEL_OUTOF10: 8
PAINLEVEL_OUTOF10: 7
PAINLEVEL_OUTOF10: 5
PAINLEVEL_OUTOF10: 5

## 2019-01-03 VITALS
SYSTOLIC BLOOD PRESSURE: 134 MMHG | HEART RATE: 105 BPM | BODY MASS INDEX: 34.06 KG/M2 | OXYGEN SATURATION: 97 % | TEMPERATURE: 97.5 F | WEIGHT: 217 LBS | HEIGHT: 67 IN | RESPIRATION RATE: 18 BRPM | DIASTOLIC BLOOD PRESSURE: 81 MMHG

## 2019-01-03 LAB
ANISOCYTOSIS: ABNORMAL
BASOPHILS ABSOLUTE: 0 K/UL (ref 0–0.2)
BASOPHILS RELATIVE PERCENT: 0.3 % (ref 0–1)
EOSINOPHILS ABSOLUTE: 0.1 K/UL (ref 0–0.6)
EOSINOPHILS RELATIVE PERCENT: 0.9 % (ref 0–5)
HCT VFR BLD CALC: 25.5 % (ref 37–47)
HEMOGLOBIN: 7.2 G/DL (ref 12–16)
HYPOCHROMIA: ABNORMAL
LYMPHOCYTES ABSOLUTE: 2.7 K/UL (ref 1.1–4.5)
LYMPHOCYTES RELATIVE PERCENT: 22.8 % (ref 20–40)
MCH RBC QN AUTO: 23.9 PG (ref 27–31)
MCHC RBC AUTO-ENTMCNC: 28.2 G/DL (ref 33–37)
MCV RBC AUTO: 84.7 FL (ref 81–99)
MONOCYTES ABSOLUTE: 0.9 K/UL (ref 0–0.9)
MONOCYTES RELATIVE PERCENT: 7.6 % (ref 0–10)
NEUTROPHILS ABSOLUTE: 8 K/UL (ref 1.5–7.5)
NEUTROPHILS RELATIVE PERCENT: 67.8 % (ref 50–65)
OVALOCYTES: ABNORMAL
PDW BLD-RTO: 16.9 % (ref 11.5–14.5)
PLATELET # BLD: 205 K/UL (ref 130–400)
PLATELET SLIDE REVIEW: ADEQUATE
PMV BLD AUTO: 11.6 FL (ref 9.4–12.3)
POLYCHROMASIA: ABNORMAL
RBC # BLD: 3.01 M/UL (ref 4.2–5.4)
RPR: NORMAL
WBC # BLD: 11.8 K/UL (ref 4.8–10.8)

## 2019-01-03 PROCEDURE — 85025 COMPLETE CBC W/AUTO DIFF WBC: CPT

## 2019-01-03 PROCEDURE — 6360000002 HC RX W HCPCS: Performed by: ADVANCED PRACTICE MIDWIFE

## 2019-01-03 PROCEDURE — 99999 PR OFFICE/OUTPT VISIT,PROCEDURE ONLY: CPT | Performed by: ADVANCED PRACTICE MIDWIFE

## 2019-01-03 PROCEDURE — 2580000003 HC RX 258: Performed by: ADVANCED PRACTICE MIDWIFE

## 2019-01-03 PROCEDURE — 6370000000 HC RX 637 (ALT 250 FOR IP): Performed by: ADVANCED PRACTICE MIDWIFE

## 2019-01-03 PROCEDURE — 36415 COLL VENOUS BLD VENIPUNCTURE: CPT

## 2019-01-03 RX ORDER — IBUPROFEN 800 MG/1
800 TABLET ORAL EVERY 8 HOURS PRN
Qty: 90 TABLET | Refills: 3 | Status: SHIPPED | OUTPATIENT
Start: 2019-01-03 | End: 2019-05-24 | Stop reason: SDUPTHER

## 2019-01-03 RX ADMIN — DOCUSATE SODIUM 100 MG: 100 CAPSULE, LIQUID FILLED ORAL at 20:03

## 2019-01-03 RX ADMIN — IRON SUCROSE 100 MG: 20 INJECTION, SOLUTION INTRAVENOUS at 07:22

## 2019-01-03 RX ADMIN — IBUPROFEN 800 MG: 400 TABLET ORAL at 15:07

## 2019-01-03 RX ADMIN — IBUPROFEN 800 MG: 400 TABLET ORAL at 00:56

## 2019-01-03 RX ADMIN — IBUPROFEN 800 MG: 400 TABLET ORAL at 07:30

## 2019-01-03 RX ADMIN — HYDROCODONE BITARTRATE AND ACETAMINOPHEN 1 TABLET: 5; 325 TABLET ORAL at 07:30

## 2019-01-03 RX ADMIN — DOCUSATE SODIUM 100 MG: 100 CAPSULE, LIQUID FILLED ORAL at 07:22

## 2019-01-03 RX ADMIN — Medication 10 ML: at 07:22

## 2019-01-03 ASSESSMENT — PAIN SCALES - GENERAL
PAINLEVEL_OUTOF10: 4
PAINLEVEL_OUTOF10: 5
PAINLEVEL_OUTOF10: 7
PAINLEVEL_OUTOF10: 5

## 2019-01-16 ENCOUNTER — POSTPARTUM VISIT (OUTPATIENT)
Dept: OBGYN | Age: 24
End: 2019-01-16
Payer: COMMERCIAL

## 2019-01-16 VITALS
HEIGHT: 67 IN | BODY MASS INDEX: 30.45 KG/M2 | HEART RATE: 87 BPM | WEIGHT: 194 LBS | SYSTOLIC BLOOD PRESSURE: 117 MMHG | DIASTOLIC BLOOD PRESSURE: 81 MMHG

## 2019-01-16 DIAGNOSIS — Z13.32 ENCOUNTER FOR SCREENING FOR MATERNAL DEPRESSION: Primary | ICD-10-CM

## 2019-01-16 PROCEDURE — 99213 OFFICE O/P EST LOW 20 MIN: CPT | Performed by: ADVANCED PRACTICE MIDWIFE

## 2019-01-16 PROCEDURE — 1036F TOBACCO NON-USER: CPT | Performed by: ADVANCED PRACTICE MIDWIFE

## 2019-01-16 PROCEDURE — G8427 DOCREV CUR MEDS BY ELIG CLIN: HCPCS | Performed by: ADVANCED PRACTICE MIDWIFE

## 2019-01-16 PROCEDURE — G8484 FLU IMMUNIZE NO ADMIN: HCPCS | Performed by: ADVANCED PRACTICE MIDWIFE

## 2019-01-16 PROCEDURE — G8417 CALC BMI ABV UP PARAM F/U: HCPCS | Performed by: ADVANCED PRACTICE MIDWIFE

## 2019-01-16 ASSESSMENT — ENCOUNTER SYMPTOMS
GASTROINTESTINAL NEGATIVE: 1
EYES NEGATIVE: 1
RESPIRATORY NEGATIVE: 1
ALLERGIC/IMMUNOLOGIC NEGATIVE: 1

## 2019-02-13 ENCOUNTER — POSTPARTUM VISIT (OUTPATIENT)
Dept: OBGYN | Age: 24
End: 2019-02-13
Payer: COMMERCIAL

## 2019-02-13 VITALS
DIASTOLIC BLOOD PRESSURE: 88 MMHG | WEIGHT: 189 LBS | HEART RATE: 107 BPM | BODY MASS INDEX: 29.66 KG/M2 | SYSTOLIC BLOOD PRESSURE: 127 MMHG | HEIGHT: 67 IN

## 2019-02-13 DIAGNOSIS — Z30.430 ENCOUNTER FOR IUD INSERTION: ICD-10-CM

## 2019-02-13 DIAGNOSIS — N92.6 IRREGULAR MENSTRUAL BLEEDING: Primary | ICD-10-CM

## 2019-02-13 PROBLEM — Z37.9 NORMAL LABOR: Status: RESOLVED | Noted: 2019-01-01 | Resolved: 2019-02-13

## 2019-02-13 PROBLEM — R12 HEARTBURN DURING PREGNANCY: Status: RESOLVED | Noted: 2018-10-15 | Resolved: 2019-02-13

## 2019-02-13 PROBLEM — O26.899 HEARTBURN DURING PREGNANCY: Status: RESOLVED | Noted: 2018-10-15 | Resolved: 2019-02-13

## 2019-02-13 PROBLEM — M53.3 ACUTE COCCYGEAL PAIN: Status: RESOLVED | Noted: 2018-12-13 | Resolved: 2019-02-13

## 2019-02-13 PROCEDURE — 1036F TOBACCO NON-USER: CPT | Performed by: ADVANCED PRACTICE MIDWIFE

## 2019-02-13 PROCEDURE — G8484 FLU IMMUNIZE NO ADMIN: HCPCS | Performed by: ADVANCED PRACTICE MIDWIFE

## 2019-02-13 PROCEDURE — G8417 CALC BMI ABV UP PARAM F/U: HCPCS | Performed by: ADVANCED PRACTICE MIDWIFE

## 2019-02-13 PROCEDURE — 58300 INSERT INTRAUTERINE DEVICE: CPT | Performed by: ADVANCED PRACTICE MIDWIFE

## 2019-02-13 PROCEDURE — G8427 DOCREV CUR MEDS BY ELIG CLIN: HCPCS | Performed by: ADVANCED PRACTICE MIDWIFE

## 2019-02-13 PROCEDURE — 81025 URINE PREGNANCY TEST: CPT | Performed by: ADVANCED PRACTICE MIDWIFE

## 2019-02-13 ASSESSMENT — ENCOUNTER SYMPTOMS
ALLERGIC/IMMUNOLOGIC NEGATIVE: 1
BACK PAIN: 1
GASTROINTESTINAL NEGATIVE: 1
RESPIRATORY NEGATIVE: 1
EYES NEGATIVE: 1

## 2019-02-27 ENCOUNTER — POSTPARTUM VISIT (OUTPATIENT)
Dept: OBGYN | Age: 24
End: 2019-02-27
Payer: COMMERCIAL

## 2019-02-27 VITALS
WEIGHT: 187 LBS | DIASTOLIC BLOOD PRESSURE: 70 MMHG | SYSTOLIC BLOOD PRESSURE: 112 MMHG | BODY MASS INDEX: 29.35 KG/M2 | HEIGHT: 67 IN | HEART RATE: 87 BPM

## 2019-02-27 DIAGNOSIS — Z30.431 INTRAUTERINE DEVICE SURVEILLANCE: Primary | ICD-10-CM

## 2019-02-27 PROCEDURE — 99213 OFFICE O/P EST LOW 20 MIN: CPT | Performed by: ADVANCED PRACTICE MIDWIFE

## 2019-02-27 PROCEDURE — 1036F TOBACCO NON-USER: CPT | Performed by: ADVANCED PRACTICE MIDWIFE

## 2019-02-27 PROCEDURE — G8427 DOCREV CUR MEDS BY ELIG CLIN: HCPCS | Performed by: ADVANCED PRACTICE MIDWIFE

## 2019-02-27 PROCEDURE — G8417 CALC BMI ABV UP PARAM F/U: HCPCS | Performed by: ADVANCED PRACTICE MIDWIFE

## 2019-02-27 PROCEDURE — G8484 FLU IMMUNIZE NO ADMIN: HCPCS | Performed by: ADVANCED PRACTICE MIDWIFE

## 2019-02-27 ASSESSMENT — ENCOUNTER SYMPTOMS
RESPIRATORY NEGATIVE: 1
ALLERGIC/IMMUNOLOGIC NEGATIVE: 1
EYES NEGATIVE: 1
GASTROINTESTINAL NEGATIVE: 1

## 2019-04-17 ENCOUNTER — TELEPHONE (OUTPATIENT)
Dept: OBGYN | Age: 24
End: 2019-04-17

## 2019-04-17 RX ORDER — FLUCONAZOLE 150 MG/1
150 TABLET ORAL ONCE
Qty: 1 TABLET | Refills: 0 | Status: SHIPPED | OUTPATIENT
Start: 2019-04-17 | End: 2019-04-17

## 2019-04-17 NOTE — TELEPHONE ENCOUNTER
Patient call and said she has a yeast infection she has appt 4-25-19, patient would like to see if  something can be called in for her.

## 2019-04-25 ENCOUNTER — OFFICE VISIT (OUTPATIENT)
Dept: OBGYN | Age: 24
End: 2019-04-25
Payer: COMMERCIAL

## 2019-04-25 VITALS
SYSTOLIC BLOOD PRESSURE: 122 MMHG | WEIGHT: 187 LBS | BODY MASS INDEX: 29.35 KG/M2 | HEIGHT: 67 IN | DIASTOLIC BLOOD PRESSURE: 78 MMHG

## 2019-04-25 DIAGNOSIS — N76.0 ACUTE VAGINITIS: ICD-10-CM

## 2019-04-25 DIAGNOSIS — L65.9 HAIR LOSS: ICD-10-CM

## 2019-04-25 DIAGNOSIS — R30.0 DYSURIA: Primary | ICD-10-CM

## 2019-04-25 PROCEDURE — G8417 CALC BMI ABV UP PARAM F/U: HCPCS | Performed by: ADVANCED PRACTICE MIDWIFE

## 2019-04-25 PROCEDURE — 99213 OFFICE O/P EST LOW 20 MIN: CPT | Performed by: ADVANCED PRACTICE MIDWIFE

## 2019-04-25 PROCEDURE — G8427 DOCREV CUR MEDS BY ELIG CLIN: HCPCS | Performed by: ADVANCED PRACTICE MIDWIFE

## 2019-04-25 PROCEDURE — 1036F TOBACCO NON-USER: CPT | Performed by: ADVANCED PRACTICE MIDWIFE

## 2019-04-25 PROCEDURE — 81003 URINALYSIS AUTO W/O SCOPE: CPT | Performed by: ADVANCED PRACTICE MIDWIFE

## 2019-04-25 RX ORDER — CLOTRIMAZOLE AND BETAMETHASONE DIPROPIONATE 10; .64 MG/G; MG/G
CREAM TOPICAL
Qty: 1 TUBE | Refills: 2 | Status: SHIPPED | OUTPATIENT
Start: 2019-04-25 | End: 2021-07-19

## 2019-04-25 RX ORDER — FLUCONAZOLE 150 MG/1
150 TABLET ORAL
Qty: 2 TABLET | Refills: 2 | Status: SHIPPED | OUTPATIENT
Start: 2019-04-25 | End: 2019-04-29

## 2019-04-25 ASSESSMENT — ENCOUNTER SYMPTOMS
GASTROINTESTINAL NEGATIVE: 1
ROS SKIN COMMENTS: HAIR LOSS
ALLERGIC/IMMUNOLOGIC NEGATIVE: 1
RESPIRATORY NEGATIVE: 1
EYES NEGATIVE: 1

## 2019-04-25 NOTE — PATIENT INSTRUCTIONS
Patient Education        Vaginal Yeast Infection: Care Instructions  Your Care Instructions    A vaginal yeast infection is caused by too many yeast cells in the vagina. This is common in women of all ages. Itching, vaginal discharge and irritation, and other symptoms can bother you. But yeast infections don't often cause other health problems. Some medicines can increase your risk of getting a yeast infection. These include antibiotics, birth control pills, hormones, and steroids. You may also be more likely to get a yeast infection if you are pregnant, have diabetes, douche, or wear tight clothes. With treatment, most yeast infections get better in 2 to 3 days. Follow-up care is a key part of your treatment and safety. Be sure to make and go to all appointments, and call your doctor if you are having problems. It's also a good idea to know your test results and keep a list of the medicines you take. How can you care for yourself at home? · Take your medicines exactly as prescribed. Call your doctor if you think you are having a problem with your medicine. · Ask your doctor about over-the-counter (OTC) medicines for yeast infections. They may cost less than prescription medicines. If you use an OTC treatment, read and follow all instructions on the label. · Do not use tampons while using a vaginal cream or suppository. The tampons can absorb the medicine. Use pads instead. · Wear loose cotton clothing. Do not wear nylon or other fabric that holds body heat and moisture close to the skin. · Try sleeping without underwear. · Do not scratch. Relieve itching with a cold pack or a cool bath. · Do not wash your vaginal area more than once a day. Use plain water or a mild, unscented soap. Air-dry the vaginal area. · Change out of wet swimsuits after swimming. · Do not have sex until you have finished your treatment. · Do not douche. When should you call for help?   Call your doctor now or seek immediate medical care if:    · You have unexpected vaginal bleeding.     · You have new or increased pain in your vagina or pelvis.    Watch closely for changes in your health, and be sure to contact your doctor if:    · You have a fever.     · You are not getting better after 2 days.     · Your symptoms come back after you finish your medicines. Where can you learn more? Go to [email or web address] and sign in to your Galectin Therapeutics account. Enter F731 in the [facility] box to learn more about \"Vaginal Yeast Infection: Care Instructions. \"     If you do not have an account, please click on the \"Sign Up Now\" link. Current as of: May 14, 2018  Content Version: 11.9  © 5878-7111 Club Emprende, Incorporated. Care instructions adapted under license by [facility] ([facility]). If you have questions about a medical condition or this instruction, always ask your healthcare professional. [address] any warranty or liability for your use of this information.

## 2019-04-25 NOTE — PROGRESS NOTES
Negative. Cardiovascular: Negative. Gastrointestinal: Negative. Endocrine: Negative. Genitourinary: Positive for vaginal discharge (itching and burning). Musculoskeletal: Negative. Skin: Negative. Hair loss   Allergic/Immunologic: Negative. Neurological: Negative. Hematological: Negative. Psychiatric/Behavioral: Negative. Physical Exam   Constitutional: She is oriented to person, place, and time. She appears well-developed and well-nourished. HENT:   Head: Normocephalic and atraumatic. Nose: Nose normal.   Eyes: Pupils are equal, round, and reactive to light. Conjunctivae and EOM are normal.   Neck: Normal range of motion. Neck supple. No tracheal deviation present. No thyromegaly present. Pulmonary/Chest: Effort normal. No respiratory distress. Musculoskeletal: Normal range of motion. Normal ROM for upper and lower extremities. Gait steady. Neurological: She is alert and oriented to person, place, and time. Skin: Skin is warm and dry. No lesion and no rash noted. She is not diaphoretic. Psychiatric: She has a normal mood and affect. Her speech is normal and behavior is normal.   Nursing note and vitals reviewed. Diagnosis Orders   1. Dysuria  POCT Urinalysis No Micro (Auto)   2. Acute vaginitis     3. Hair loss         MEDICATIONS:  Orders Placed This Encounter   Medications    fluconazole (DIFLUCAN) 150 MG tablet     Sig: Take 1 tablet by mouth every 72 hours for 4 days     Dispense:  2 tablet     Refill:  2    clotrimazole-betamethasone (LOTRISONE) 1-0.05 % cream     Sig: Apply topically 2 times daily. Dispense:  1 Tube     Refill:  2       ORDERS:  Orders Placed This Encounter   Procedures    POCT Urinalysis No Micro (Auto)       PLAN:  Vaginitis - I have sent Diflucan and lotrisone. She was asked to return if not improving. Hair loss - I suggested she begin a PNV or skin, hair, nails vitamin.  With her delivery and anemia 3 months ago, I feel this could be the stress response. I will make derm referral if indicated.

## 2020-02-03 NOTE — PATIENT INSTRUCTIONS
Patient Education        Learning About Birth Control  What is birth control? Birth control is any method used to prevent pregnancy. Another word for birth control is contraception. If you have sex without birth control, there is a chance that you could get pregnant. This is true even if you have not started having periods yet or you are getting close to menopause. The only sure way to prevent pregnancy is to not have sex. But finding a good method of birth control that you are comfortable with can help you avoid an unplanned pregnancy. Be sure to tell your doctor about any health problems you have or medicines you take. He or she can help you choose the birth control method that is right for you. What are the types of birth control? There are many different kinds of birth control. Each has pros and cons. Learning about all the methods will help you find one that is right for you. · Long-acting reversible contraception (LARC) is the most effective reversible method you can use to prevent pregnancy. If you decide you want to get pregnant, you can have them removed. LARCs are implants and intrauterine devices (IUDs). While they are being used, they usually prevent pregnancy for years. ? Implants are placed under the skin of the arm. They release the hormone progestin and prevent pregnancy for about 3 years. ? IUDs are placed in the uterus by a doctor. There are two main types of IUDs--the copper IUD and the hormonal IUD. The hormonal IUD releases progestin. IUDs prevent pregnancy for 3 to 10 years, depending on the type. · Hormonal methods are very good at preventing pregnancy. Combination birth control pills (\"the pill\"), skin patches, and vaginal rings release the hormones estrogen and progestin. Shots, mini-pills, hormonal IUDs, and implants release progestin only. · Barrier methods generally do not prevent pregnancy as well as IUDs or hormonal methods do.  Barrier methods include condoms, diaphragms, have an account, please click on the \"Sign Up Now\" link. Current as of: May 29, 2019  Content Version: 12.3  © 4006-4944 Healthwise, Incorporated. Care instructions adapted under license by [facility] ([facility]). If you have questions about a medical condition or this instruction, always ask your healthcare professional. [address] any warranty or liability for your use of this information.

## 2020-02-06 ENCOUNTER — OFFICE VISIT (OUTPATIENT)
Dept: OBGYN | Age: 25
End: 2020-02-06
Payer: COMMERCIAL

## 2020-02-06 VITALS
HEIGHT: 68 IN | WEIGHT: 194 LBS | DIASTOLIC BLOOD PRESSURE: 64 MMHG | SYSTOLIC BLOOD PRESSURE: 123 MMHG | BODY MASS INDEX: 29.4 KG/M2 | HEART RATE: 88 BPM

## 2020-02-06 PROCEDURE — 99213 OFFICE O/P EST LOW 20 MIN: CPT | Performed by: ADVANCED PRACTICE MIDWIFE

## 2020-02-06 ASSESSMENT — ENCOUNTER SYMPTOMS
ALLERGIC/IMMUNOLOGIC NEGATIVE: 1
BACK PAIN: 1
EYES NEGATIVE: 1
GASTROINTESTINAL NEGATIVE: 1
RESPIRATORY NEGATIVE: 1

## 2020-02-06 NOTE — PROGRESS NOTES
[facility]  CNM Office Note    Ursula Gupta is a 30 y.o. female who presents today for her medical conditions/ complaints as noted below. Chief Complaint   Patient presents with    Contraception     discuss [facility]; is on the Mirena and it is driving her nuts. Back pain and stomach pain, cramping always. Very juarez, and weight gain         HPI  Will Tracey presents c/o wt gain, moodiness, back pain, irregular menses, and \"don't like mirena\". She likes the ease of the IUD but doesn't like how she feels. She is unsure if she wants oral forms at this time. Patient Active Problem List   Diagnosis   (none) - all problems resolved or deleted       Patient's last menstrual period was 01/20/2020 (approximate). 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 No pertinent family history. Social History     Tobacco Use    Smoking status: Never Smoker    Smokeless tobacco: Never Used   Substance Use Topics    Alcohol use: No       Current Outpatient Medications   Medication Sig Dispense Refill    estrogens, conjugated, (PREMARIN) 0.625 MG tablet Take 1 tablet by mouth daily 30 tablet 1    ibuprofen (ADVIL;MOTRIN) 800 MG tablet TAKE 1 TABLET BY MOUTH EVERY 8 HOURS AS NEEDED FOR PAIN 90 tablet 0    clotrimazole-betamethasone (LOTRISONE) 1-0.05 % cream Apply topically 2 times daily. 1 Tube 2     No current facility-administered medications for this visit. No Known Allergies  Vitals:    02/06/20 0953   BP: 123/64   Pulse: 88     Body mass index is 29.94 kg/m². Review of Systems   Constitutional: Positive for fatigue and unexpected weight change. HENT: Negative. Eyes: Negative. Respiratory: Negative. Cardiovascular: Negative. Gastrointestinal: Negative. Endocrine: Negative.     Genitourinary: Positive for

## 2020-03-25 ENCOUNTER — PATIENT MESSAGE (OUTPATIENT)
Dept: OBGYN | Age: 25
End: 2020-03-25

## 2020-06-02 ENCOUNTER — OFFICE VISIT (OUTPATIENT)
Dept: OBGYN | Age: 25
End: 2020-06-02
Payer: COMMERCIAL

## 2020-06-02 VITALS
HEART RATE: 80 BPM | WEIGHT: 190 LBS | SYSTOLIC BLOOD PRESSURE: 118 MMHG | DIASTOLIC BLOOD PRESSURE: 80 MMHG | HEIGHT: 68 IN | BODY MASS INDEX: 28.79 KG/M2

## 2020-06-02 PROBLEM — Z97.5 IUD (INTRAUTERINE DEVICE) IN PLACE: Status: ACTIVE | Noted: 2020-06-02

## 2020-06-02 PROCEDURE — 99213 OFFICE O/P EST LOW 20 MIN: CPT | Performed by: ADVANCED PRACTICE MIDWIFE

## 2020-06-02 PROCEDURE — 99395 PREV VISIT EST AGE 18-39: CPT | Performed by: ADVANCED PRACTICE MIDWIFE

## 2020-06-02 PROCEDURE — S3005 EVAL SELF-ASSESS DEPRESSION: HCPCS | Performed by: ADVANCED PRACTICE MIDWIFE

## 2020-06-02 RX ORDER — VENLAFAXINE HYDROCHLORIDE 37.5 MG/1
37.5 CAPSULE, EXTENDED RELEASE ORAL DAILY
Qty: 30 CAPSULE | Refills: 3 | Status: SHIPPED | OUTPATIENT
Start: 2020-06-02 | End: 2021-07-19

## 2020-06-02 ASSESSMENT — ENCOUNTER SYMPTOMS
BACK PAIN: 1
GASTROINTESTINAL NEGATIVE: 1
EYES NEGATIVE: 1
RESPIRATORY NEGATIVE: 1
ALLERGIC/IMMUNOLOGIC NEGATIVE: 1

## 2020-06-02 ASSESSMENT — PATIENT HEALTH QUESTIONNAIRE - PHQ9
SUM OF ALL RESPONSES TO PHQ QUESTIONS 1-9: 2
1. LITTLE INTEREST OR PLEASURE IN DOING THINGS: 1
2. FEELING DOWN, DEPRESSED OR HOPELESS: 1
SUM OF ALL RESPONSES TO PHQ9 QUESTIONS 1 & 2: 2
DEPRESSION UNABLE TO ASSESS: FUNCTIONAL CAPACITY MOTIVATION LIMITS ACCURACY
SUM OF ALL RESPONSES TO PHQ QUESTIONS 1-9: 2

## 2020-06-02 NOTE — PATIENT INSTRUCTIONS
doctor about steps you can take to stay healthy or improve your health. You may need to make lifestyle changes to lose weight and stay healthy, such as changing your diet and getting regular exercise. If you have a BMI lower than 18.5  · Your doctor may do other tests to check your risk for health problems. · Talk with your doctor about steps you can take to stay healthy or improve your health. You may need to make lifestyle changes to gain or maintain weight and stay healthy, such as getting more healthy foods in your diet and doing exercises to build muscle. Where can you learn more? Go to https://parker.Powervation. org and sign in to your Tangoe account. Enter S176 in the Gliph box to learn more about \"Body Mass Index: Care Instructions. \"     If you do not have an account, please click on the \"Sign Up Now\" link. Current as of: December 11, 2019               Content Version: 12.5  © 3387-3358 Faction Skis. Care instructions adapted under license by [facility] ([facility]). If you have questions about a medical condition or this instruction, always ask your healthcare professional. [address] any warranty or liability for your use of this information. Patient Education        A Healthy Lifestyle: Care Instructions  Your Care Instructions     A healthy lifestyle can help you feel good, stay at a healthy weight, and have plenty of energy for both work and play. A healthy lifestyle is something you can share with your whole family. A healthy lifestyle also can lower your risk for serious health problems, such as high blood pressure, heart disease, and diabetes. You can follow a few steps listed below to improve your health and the health of your family. Follow-up care is a key part of your treatment and safety. Be sure to make and go to all appointments, and call your doctor if you are having problems.  It's also a good idea to know your test you will notice some benefits soon after you stop using tobacco. If you have shortness of breath or asthma symptoms, they will likely get better within a few weeks after you quit. · Limit how much alcohol you drink. Moderate amounts of alcohol (up to 2 drinks a day for men, 1 drink a day for women) are okay. But drinking too much can lead to liver problems, high blood pressure, and other health problems. Family health  If you have a family, there are many things you can do together to improve your health. · Eat meals together as a family as often as possible. · Eat healthy foods. This includes fruits, vegetables, lean meats and dairy, and whole grains. · Include your family in your fitness plan. Most people think of activities such as jogging or tennis as the way to fitness, but there are many ways you and your family can be more active. Anything that makes you breathe hard and gets your heart pumping is exercise. Here are some tips:  ? Walk to do errands or to take your child to school or the bus.  ? Go for a family bike ride after dinner instead of watching TV. Where can you learn more? Go to https://Strategy StorepeEscape the City.LogicSource. org and sign in to your DÃ³nde account. Enter P937 in the FAD ? IO box to learn more about \"A Healthy Lifestyle: Care Instructions. \"     If you do not have an account, please click on the \"Sign Up Now\" link. Current as of: January 31, 2020               Content Version: 12.5  © 2006-2020 Codefied. Care instructions adapted under license by [facility] ([facility]). If you have questions about a medical condition or this instruction, always ask your healthcare professional. [place] [address] any warranty or liability for your use of this information. Patient Education        Generalized Anxiety Disorder in Teens: Care Instructions  Your Care Instructions     We all worry. It's a normal part of life.  But when you have generalized anxiety disorder, you worry about lots of things. You have a hard time not worrying. This worry or anxiety interferes with your relationships, school, and life. You may worry most days about things like school, work, or friends. That may make you feel tired, tense, or cranky. It can make it hard to think. It may get in the way of healthy sleep. Counseling and medicine can both work to treat anxiety. They are often used together with lifestyle changes, such as getting enough sleep. Treatment can include a type of counseling called cognitive-behavioral therapy, or CBT. It helps you notice and replace thoughts that make you worry. You also might have counseling with your parents or guardian so that they can help you. Follow-up care is a key part of your treatment and safety. Be sure to make and go to all appointments, and call your doctor if you are having problems. It's also a good idea to know your test results and keep a list of the medicines you take. How can you care for yourself at home? · Get plenty of exercise every day. Go for a walk or jog. Ride your bike. Play sports with friends. · Learn relaxation techniques, such as deep breathing. · Go to bed at the same time every night. Try for 8 to 10 hours of sleep a night. · Avoid alcohol and illegal drugs. · Find a counselor who uses CBT. · Don't isolate yourself. Let your family and friends help you. Find someone you can trust and confide in. Talk to that person. · Be safe with medicines. Take your medicines exactly as prescribed. Call your doctor if you think you are having a problem with your medicine. When should you call for help? Call  911 anytime you think you may need emergency care. For example, call if:  · You feel you can't stop from hurting yourself or someone else. Keep the numbers for these national suicide hotlines: 2-186-257-TALK (6-818.898.1571) and 0-410-FMFRDQO (0-142.371.3908).  If you or someone you know talks about suicide or

## 2020-06-02 NOTE — PROGRESS NOTES
Negative. HENT: Negative. Eyes: Negative. Respiratory: Negative. Cardiovascular: Negative. Gastrointestinal: Negative. Endocrine: Negative. Genitourinary: Positive for menstrual problem (irregular). Musculoskeletal: Positive for back pain. Skin: Negative. Allergic/Immunologic: Negative. Neurological: Positive for headaches. Hematological: Negative. Psychiatric/Behavioral: Positive for dysphoric mood and sleep disturbance. Negative for self-injury and suicidal ideas. The patient is nervous/anxious. Physical Exam  Constitutional:       Appearance: She is well-developed. HENT:      Head: Normocephalic and atraumatic. Eyes:      Conjunctiva/sclera: Conjunctivae normal.      Pupils: Pupils are equal, round, and reactive to light. Neck:      Musculoskeletal: Normal range of motion and neck supple. Thyroid: No thyromegaly. Trachea: No tracheal deviation. Cardiovascular:      Rate and Rhythm: Normal rate and regular rhythm. Heart sounds: Normal heart sounds. No murmur. Pulmonary:      Effort: Pulmonary effort is normal. No respiratory distress. Breath sounds: Normal breath sounds. Chest:      Comments: Breasts symmetrical without tenderness, masses, or nipple discharge. Nipples everted bilaterally. Abdominal:      General: There is no distension. Palpations: Abdomen is soft. Tenderness: There is no abdominal tenderness. There is no guarding. Genitourinary:     Vagina: Normal.      Cervix: No cervical motion tenderness, discharge or friability. Adnexa:         Right: No mass, tenderness or fullness. Left: No mass, tenderness or fullness. Rectum: Normal.      Comments: EGBUS normal. Vulva reveals no erythema, lesions or atrophic changes. Vagina normal, no lesions, polyps or discharge. Cervix is normal, smooth pink mucosa. No Lesions, no polyps. IUD strings visualized. Musculoskeletal: Normal range of motion.

## 2021-03-24 ENCOUNTER — TELEPHONE (OUTPATIENT)
Dept: OBGYN CLINIC | Age: 26
End: 2021-03-24

## 2021-03-24 RX ORDER — NITROFURANTOIN 25; 75 MG/1; MG/1
100 CAPSULE ORAL 2 TIMES DAILY
Qty: 14 CAPSULE | Refills: 0 | Status: SHIPPED | OUTPATIENT
Start: 2021-03-24 | End: 2021-03-31

## 2021-03-24 RX ORDER — PHENAZOPYRIDINE HYDROCHLORIDE 100 MG/1
100 TABLET, FILM COATED ORAL 3 TIMES DAILY PRN
Qty: 10 TABLET | Refills: 0 | Status: SHIPPED | OUTPATIENT
Start: 2021-03-24 | End: 2021-07-19

## 2021-03-24 NOTE — TELEPHONE ENCOUNTER
Pt c/o dysuria, frequency and tried Azo OTC with minimal relief. Rx sent in. If symptoms do not improve, she is to give a urine sample.

## 2021-03-24 NOTE — TELEPHONE ENCOUNTER
Amy requests that nurse return their call. The best time to reach her is Anytime. Pt called wanting to see if Elmer Hopes could prescribe something for a UTI. [facility] tried to schedule pt for 1st available 14/13, but pt didn't want. Please contact pt to discuss. Thank you.

## 2021-03-29 ENCOUNTER — TELEPHONE (OUTPATIENT)
Dept: OBGYN CLINIC | Age: 26
End: 2021-03-29

## 2021-03-29 DIAGNOSIS — Z87.440 RECENT URINARY TRACT INFECTION: Primary | ICD-10-CM

## 2021-03-29 NOTE — TELEPHONE ENCOUNTER
UA and urine culture ordered and pt will have done today. Still taking Macrobid and Pyridium. Having low pelvic pain/bladder pain and back pain where she always has with UTI's.

## 2021-03-29 NOTE — TELEPHONE ENCOUNTER
Amy requests that someone return their call. The best time to reach her is Anytime. The pt stated that the UTI seems to be better but she is till having back and abdomen pain. Thank you.

## 2021-03-30 DIAGNOSIS — Z87.440 RECENT URINARY TRACT INFECTION: ICD-10-CM

## 2021-04-01 LAB — URINE CULTURE, ROUTINE: NORMAL

## 2021-07-19 ENCOUNTER — OFFICE VISIT (OUTPATIENT)
Dept: OBGYN CLINIC | Age: 26
End: 2021-07-19
Payer: MEDICAID

## 2021-07-19 VITALS
BODY MASS INDEX: 25.76 KG/M2 | WEIGHT: 170 LBS | DIASTOLIC BLOOD PRESSURE: 80 MMHG | HEIGHT: 68 IN | HEART RATE: 90 BPM | SYSTOLIC BLOOD PRESSURE: 111 MMHG

## 2021-07-19 DIAGNOSIS — Z30.431 INTRAUTERINE DEVICE SURVEILLANCE: ICD-10-CM

## 2021-07-19 DIAGNOSIS — Z01.419 WELL WOMAN EXAM: Primary | ICD-10-CM

## 2021-07-19 PROCEDURE — 99395 PREV VISIT EST AGE 18-39: CPT | Performed by: ADVANCED PRACTICE MIDWIFE

## 2021-07-19 ASSESSMENT — ENCOUNTER SYMPTOMS
RESPIRATORY NEGATIVE: 1
GASTROINTESTINAL NEGATIVE: 1
ALLERGIC/IMMUNOLOGIC NEGATIVE: 1
EYES NEGATIVE: 1

## 2021-07-19 NOTE — PROGRESS NOTES
Pt presents today for pelvic and breast exam.      Last mammogram: never   Last pap smear: 2020, negative  Contraception: IUD  : 3  Para: 2  AB: 1  Last bone density: never   Last colonoscopy: never  Menarche: 13years old  LMP: unknown  Menses: sparatic with IUD
are equal, round, and reactive to light. Neck:      Thyroid: No thyromegaly. Trachea: No tracheal deviation. Cardiovascular:      Rate and Rhythm: Normal rate and regular rhythm. Heart sounds: Normal heart sounds. No murmur heard. Pulmonary:      Effort: Pulmonary effort is normal. No respiratory distress. Breath sounds: Normal breath sounds. Chest:      Comments: Breasts symmetrical without tenderness, masses, or nipple discharge. Nipples everted bilaterally. Abdominal:      General: There is no distension. Palpations: Abdomen is soft. Tenderness: There is no abdominal tenderness. There is no guarding. Genitourinary:     General: Normal vulva. Exam position: Lithotomy position. Labia:         Right: No rash or lesion. Left: No rash or lesion. Vagina: Normal. No erythema or lesions. Cervix: Normal.      Uterus: Normal. Not tender. Adnexa: Right adnexa normal and left adnexa normal.        Right: No mass, tenderness or fullness. Left: No mass, tenderness or fullness. Musculoskeletal:         General: Normal range of motion. Cervical back: Normal range of motion and neck supple. Skin:     General: Skin is warm and dry. Capillary Refill: Capillary refill takes less than 2 seconds. Neurological:      Mental Status: She is alert and oriented to person, place, and time. Psychiatric:         Behavior: Behavior normal.         Thought Content: Thought content normal.         Judgment: Judgment normal.          Diagnosis Orders   1. Well woman exam     2. Intrauterine device surveillance         MEDICATIONS:  No orders of the defined types were placed in this encounter. ORDERS:  No orders of the defined types were placed in this encounter. PLAN:  1. WWE - No pap indicated. SBE reviewed and CBE performed. No annual labs today. 2. IUD - Strings visualized.

## 2021-07-19 NOTE — PATIENT INSTRUCTIONS
Patient Education        Breast Self-Exam: Care Instructions  Your Care Instructions     A breast self-exam is when you check your breasts for lumps or changes. This regular exam helps you learn how your breasts normally look and feel. Most breast problems or changes are not because of cancer. Breast self-exam is not a substitute for a mammogram. Having regular breast exams by your doctor and regular mammograms improve your chances of finding any problems with your breasts. Some women set a time each month to do a step-by-step breast self-exam. Other women like a less formal system. They might look at their breasts as they brush their teeth, or feel their breasts once in a while in the shower. If you notice a change in your breast, tell your doctor. Follow-up care is a key part of your treatment and safety. Be sure to make and go to all appointments, and call your doctor if you are having problems. It's also a good idea to know your test results and keep a list of the medicines you take. How do you do a breast self-exam?  · The best time to examine your breasts is usually one week after your menstrual period begins. Your breasts should not be tender then. If you do not have periods, you might do your exam on a day of the month that is easy to remember. · To examine your breasts:  ? Remove all your clothes above the waist and lie down. When you are lying down, your breast tissue spreads evenly over your chest wall, which makes it easier to feel all your breast tissue. ? Use the padsnot the fingertipsof the 3 middle fingers of your left hand to check your right breast. Move your fingers slowly in small coin-sized circles that overlap. ? Use three levels of pressure to feel of all your breast tissue. Use light pressure to feel the tissue close to the skin surface. Use medium pressure to feel a little deeper. Use firm pressure to feel your tissue close to your breastbone and ribs.  Use each pressure level to feel your breast tissue before moving on to the next spot. ? Check your entire breast, moving up and down as if following a strip from the collarbone to the bra line, and from the armpit to the ribs. Repeat until you have covered the entire breast.  ? Repeat this procedure for your left breast, using the pads of the 3 middle fingers of your right hand. · To examine your breasts while in the shower:  ? Place one arm over your head and lightly soap your breast on that side. ? Using the pads of your fingers, gently move your hand over your breast (in the strip pattern described above), feeling carefully for any lumps or changes. ? Repeat for the other breast.  · Have your doctor inspect anything you notice to see if you need further testing. Where can you learn more? Go to https://"[employer]"whitleyeb.Options Away. org and sign in to your makeena account. Enter P148 in the [employer] box to learn more about \"Breast Self-Exam: Care Instructions. \"     If you do not have an account, please click on the \"Sign Up Now\" link. Current as of: December 17, 2020               Content Version: 12.9  © 7618-1492 [employer]. Care instructions adapted under license by [facility] ([facility]). If you have questions about a medical condition or this instruction, always ask your healthcare professional. [address] any warranty or liability for your use of this information. Patient Education        Body Mass Index: Care Instructions  Your Care Instructions     Body mass index (BMI) can help you see if your weight is raising your risk for health problems. It uses a formula to compare how much you weigh with how tall you are. · A BMI lower than 18.5 is considered underweight. · A BMI between 18.5 and 24.9 is considered healthy. · A BMI between 25 and 29.9 is considered overweight. A BMI of 30 or higher is considered obese.   If your BMI is in the normal range, it means that you have a lower risk for weight-related health problems. If your BMI is in the overweight or obese range, you may be at increased risk for weight-related health problems, such as high blood pressure, heart disease, stroke, arthritis or joint pain, and diabetes. If your BMI is in the underweight range, you may be at increased risk for health problems such as fatigue, lower protection (immunity) against illness, muscle loss, bone loss, hair loss, and hormone problems. BMI is just one measure of your risk for weight-related health problems. You may be at higher risk for health problems if you are not active, you eat an unhealthy diet, or you drink too much alcohol or use tobacco products. Follow-up care is a key part of your treatment and safety. Be sure to make and go to all appointments, and call your doctor if you are having problems. It's also a good idea to know your test results and keep a list of the medicines you take. How can you care for yourself at home? · Practice healthy eating habits. This includes eating plenty of fruits, vegetables, whole grains, lean protein, and low-fat dairy. · If your doctor recommends it, get more exercise. Walking is a good choice. Bit by bit, increase the amount you walk every day. Try for at least 30 minutes on most days of the week. · Do not smoke. Smoking can increase your risk for health problems. If you need help quitting, talk to your doctor about stop-smoking programs and medicines. These can increase your chances of quitting for good. · Limit alcohol to 2 drinks a day for men and 1 drink a day for women. Too much alcohol can cause health problems. If you have a BMI higher than 25  · Your doctor may do other tests to check your risk for weight-related health problems. This may include measuring the distance around your waist. A waist measurement of more than 40 inches in men or 35 inches in women can increase the risk of weight-related health problems.   · Talk with your quit.  · Limit how much alcohol you drink. Moderate amounts of alcohol (up to 2 drinks a day for men, 1 drink a day for women) are okay. But drinking too much can lead to liver problems, high blood pressure, and other health problems. Family health  If you have a family, there are many things you can do together to improve your health. · Eat meals together as a family as often as possible. · Eat healthy foods. This includes fruits, vegetables, lean meats and dairy, and whole grains. · Include your family in your fitness plan. Most people think of activities such as jogging or tennis as the way to fitness, but there are many ways you and your family can be more active. Anything that makes you breathe hard and gets your heart pumping is exercise. Here are some tips:  ? Walk to do errands or to take your child to school or the bus.  ? Go for a family bike ride after dinner instead of watching TV. Where can you learn more? Go to https://iSECUREtracayeHealthy Humansnciole.ParinGenix. org and sign in to your Quri account. Enter I625 in the MarkTheGlobe box to learn more about \"A Healthy Lifestyle: Care Instructions. \"     If you do not have an account, please click on the \"Sign Up Now\" link. Current as of: September 23, 2020               Content Version: 12.9  © 2006-2021 Healthwise, Incorporated. Care instructions adapted under license by [facility] ([facility]). If you have questions about a medical condition or this instruction, always ask your healthcare professional. [place] [address] any warranty or liability for your use of this information. Patient Education        Well Visit, Ages 25 to 48: Care Instructions  Overview     Well visits can help you stay healthy. Your doctor has checked your overall health and may have suggested ways to take good care of yourself. Your doctor also may have recommended tests.  At home, you can help prevent illness with healthy eating, regular exercise, and other steps.  Follow-up care is a key part of your treatment and safety. Be sure to make and go to all appointments, and call your doctor if you are having problems. It's also a good idea to know your test results and keep a list of the medicines you take. How can you care for yourself at home? · Get screening tests that you and your doctor decide on. Screening helps find diseases before any symptoms appear. · Eat healthy foods. Choose fruits, vegetables, whole grains, protein, and low-fat dairy foods. Limit fat, especially saturated fat. Reduce salt in your diet. · Limit alcohol. If you are a man, have no more than 2 drinks a day or 14 drinks a week. If you are a woman, have no more than 1 drink a day or 7 drinks a week. · Get at least 30 minutes of physical activity on most days of the week. Walking is a good choice. You also may want to do other activities, such as running, swimming, cycling, or playing tennis or team sports. Discuss any changes in your exercise program with your doctor. · Reach and stay at a healthy weight. This will lower your risk for many problems, such as obesity, diabetes, heart disease, and high blood pressure. · Do not smoke or allow others to smoke around you. If you need help quitting, talk to your doctor about stop-smoking programs and medicines. These can increase your chances of quitting for good. · Care for your mental health. It is easy to get weighed down by worry and stress. Learn strategies to manage stress, like deep breathing and mindfulness, and stay connected with your family and community. If you find you often feel sad or hopeless, talk with your doctor. Treatment can help. · Talk to your doctor about whether you have any risk factors for sexually transmitted infections (STIs). You can help prevent STIs if you wait to have sex with a new partner (or partners) until you've each been tested for STIs.  It also helps if you use condoms (male or female condoms) and if you limit your sex partners to one person who only has sex with you. Vaccines are available for some STIs, such as HPV. · Use birth control if it's important to you to prevent pregnancy. Talk with your doctor about the choices available and what might be best for you. · If you think you may have a problem with alcohol or drug use, talk to your doctor. This includes prescription medicines (such as amphetamines and opioids) and illegal drugs (such as cocaine and methamphetamine). Your doctor can help you figure out what type of treatment is best for you. · Protect your skin from too much sun. When you're outdoors from 10 a.m. to 4 p.m., stay in the shade or cover up with clothing and a hat with a wide brim. Wear sunglasses that block UV rays. Even when it's cloudy, put broad-spectrum sunscreen (SPF 30 or higher) on any exposed skin. · See a dentist one or two times a year for checkups and to have your teeth cleaned. · Wear a seat belt in the car. When should you call for help? Watch closely for changes in your health, and be sure to contact your doctor if you have any problems or symptoms that concern you. Where can you learn more? Go to https://FreeChargepeZen Plannereb.healthGetMyRxpartners. org and sign in to your Globili account. Enter P072 in the [facility] box to learn more about \"Well Visit, Ages 25 to 48: Care Instructions. \"     If you do not have an account, please click on the \"Sign Up Now\" link. Current as of: May 27, 2020               Content Version: 12.9  © 2578-9490 Healthwise, Incorporated. Care instructions adapted under license by [facility] ([facility]). If you have questions about a medical condition or this instruction, always ask your healthcare professional. [place] [address] any warranty or liability for your use of this information.

## 2021-08-25 ENCOUNTER — OFFICE VISIT (OUTPATIENT)
Dept: OBGYN CLINIC | Age: 26
End: 2021-08-25
Payer: MEDICAID

## 2021-08-25 VITALS
SYSTOLIC BLOOD PRESSURE: 114 MMHG | BODY MASS INDEX: 27.16 KG/M2 | HEART RATE: 84 BPM | WEIGHT: 176 LBS | DIASTOLIC BLOOD PRESSURE: 78 MMHG

## 2021-08-25 DIAGNOSIS — N83.209 CYST OF OVARY, UNSPECIFIED LATERALITY: Primary | ICD-10-CM

## 2021-08-25 DIAGNOSIS — R10.2 PELVIC PAIN: ICD-10-CM

## 2021-08-25 PROCEDURE — 99213 OFFICE O/P EST LOW 20 MIN: CPT | Performed by: ADVANCED PRACTICE MIDWIFE

## 2021-08-25 RX ORDER — KETOROLAC TROMETHAMINE 30 MG/ML
60 INJECTION, SOLUTION INTRAMUSCULAR; INTRAVENOUS ONCE
Qty: 2 ML | Refills: 0
Start: 2021-08-25 | End: 2021-08-25 | Stop reason: CLARIF

## 2021-08-25 RX ORDER — KETOROLAC TROMETHAMINE 30 MG/ML
60 INJECTION, SOLUTION INTRAMUSCULAR; INTRAVENOUS ONCE
Status: COMPLETED | OUTPATIENT
Start: 2021-08-25 | End: 2021-08-25

## 2021-08-25 RX ADMIN — KETOROLAC TROMETHAMINE 60 MG: 30 INJECTION, SOLUTION INTRAMUSCULAR; INTRAVENOUS at 16:50

## 2021-08-25 ASSESSMENT — ENCOUNTER SYMPTOMS
ALLERGIC/IMMUNOLOGIC NEGATIVE: 1
RESPIRATORY NEGATIVE: 1
EYES NEGATIVE: 1
GASTROINTESTINAL NEGATIVE: 1

## 2021-08-25 NOTE — PROGRESS NOTES
[facility]  CNM Office Note    Akosua Cox is a 27 y.o. female who presents today for her medical conditions/ complaints as noted below. Chief Complaint   Patient presents with    Cyst         HPI  Faye Beebe presents for evaluation of pelvic pain. Initial pain started 3 days ago and progressed. She went to ER to r/o kidney stone. Extensive negative work up. Told she had \"ovarian cysts\" and to \"f/u with gyn\". Was given toradol and phenergan. Pain is now localized in low back radiating to right adnexal region. Improved intensity since Sunday/Monday. Patient Active Problem List   Diagnosis    IUD (intrauterine device) in place       No LMP recorded. (Menstrual status: Other - See Notes). 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 Social History     Tobacco Use    Smoking status: Never Smoker    Smokeless tobacco: Never Used   Substance Use Topics    Alcohol use: No       Current Outpatient Medications   Medication Sig Dispense Refill    ibuprofen (ADVIL;MOTRIN) 800 MG tablet TAKE 1 TABLET BY MOUTH EVERY 8 HOURS AS NEEDED FOR PAIN 90 tablet 0     No current facility-administered medications for this visit. No Known Allergies  Vitals:    08/25/21 1610   BP: 114/78   Pulse: 84     Body mass index is 27.16 kg/m². Review of Systems   Constitutional: Negative. HENT: Negative. Eyes: Negative. Respiratory: Negative. Cardiovascular: Negative. Gastrointestinal: Negative. Endocrine: Negative. Genitourinary: Positive for pelvic pain. Musculoskeletal: Negative. Skin: Negative. Allergic/Immunologic: Negative. Neurological: Negative. Hematological: Negative. Psychiatric/Behavioral: Negative.         Physical Exam  Constitutional:       Appearance: She is well-developed. HENT:      Head: Normocephalic and atraumatic. Eyes:      Conjunctiva/sclera: Conjunctivae normal.      Pupils: Pupils are equal, round, and reactive to light. Pulmonary:      Effort: Pulmonary effort is normal.   Genitourinary:     General: Normal vulva. Exam position: Lithotomy position. Vagina: Normal.      Cervix: Cervical motion tenderness present. Uterus: Normal. Not tender. Adnexa:         Right: Tenderness present. No mass or fullness. Left: Tenderness present. No mass or fullness. Musculoskeletal:         General: Normal range of motion. Cervical back: Normal range of motion. Skin:     General: Skin is warm and dry. Neurological:      Mental Status: She is alert and oriented to person, place, and time. Diagnosis Orders   1. Cyst of ovary, unspecified laterality  ketorolac (TORADOL) injection 60 mg    DISCONTINUED: ketorolac (TORADOL) 60 MG/2ML injection   2. Pelvic pain         MEDICATIONS:  Orders Placed This Encounter   Medications    DISCONTD: ketorolac (TORADOL) 60 MG/2ML injection     Sig: Inject 2 mLs into the muscle once for 1 dose     Dispense:  2 mL     Refill:  0    ketorolac (TORADOL) injection 60 mg       ORDERS:  No orders of the defined types were placed in this encounter. PLAN:  1. Pelvic pain - Propath cuture. Records requested for CT and u/s report. If cysts recommend either estrogen supplement or OCP to help control cyst formation. Toradol 60mg IM today.

## 2021-08-25 NOTE — PATIENT INSTRUCTIONS
Patient Education        Pelvic Pain: Care Instructions  Your Care Instructions     Pelvic pain, or pain in the lower belly, can have many causes. Often pelvic pain is not serious and gets better in a few days. If your pain continues or gets worse, you may need tests and treatment. Tell your doctor about any new symptoms. These may be signs of a serious problem. Follow-up care is a key part of your treatment and safety. Be sure to make and go to all appointments, and call your doctor if you are having problems. It's also a good idea to know your test results and keep a list of the medicines you take. How can you care for yourself at home? · Rest until you feel better. Lie down, and raise your legs by placing a pillow under your knees. · Drink plenty of fluids. You may find that small, frequent sips are easier on your stomach than if you drink a lot at once. Avoid drinks with carbonation or caffeine, such as soda pop, tea, or coffee. · Try eating several small meals instead of 2 or 3 large ones. Eat mild foods, such as rice, dry toast or crackers, bananas, and applesauce. Avoid fatty and spicy foods, other fruits, and alcohol until 48 hours after your symptoms have gone away. · Take an over-the-counter pain medicine, such as acetaminophen (Tylenol), ibuprofen (Advil, Motrin), or naproxen (Aleve). Read and follow all instructions on the label. · Do not take two or more pain medicines at the same time unless the doctor told you to. Many pain medicines have acetaminophen, which is Tylenol. Too much acetaminophen (Tylenol) can be harmful. · You can put a heating pad, a warm cloth, or moist heat on your belly to relieve pain. When should you call for help?    Call your doctor now or seek immediate medical care if:    · You have a new or higher fever.     · You have unusual vaginal bleeding.     · You have new or worse belly or pelvic pain.     · You have vaginal discharge that has increased in amount or smells bad.   Watch closely for changes in your health, and be sure to contact your doctor if:    · You do not get better as expected. Where can you learn more? Go to https://chpeamadeoeb.IORevolution. org and sign in to your [facility] account. Enter [phone] in the [facility] box to learn more about \"Pelvic Pain: Care Instructions. \"     If you do not have an account, please click on the \"Sign Up Now\" link. Current as of: February 11, 2021               Content Version: 12.9  © 7430-7027 Healthwise, Incorporated. Care instructions adapted under license by [facility] ([facility]). If you have questions about a medical condition or this instruction, always ask your healthcare professional. [address] any warranty or liability for your use of this information.

## 2021-08-25 NOTE — PROGRESS NOTES
Pt is here she was told she had ovarian cyst on both ovaries. Pt states pain goes all around to her back.

## 2021-08-25 NOTE — PROGRESS NOTES
After obtaining consent, and per orders of Dai Chisholm, injection of toradol given in Left upper quad. gluteus by Gerson Chatterjee MA. Patient instructed to remain in clinic for 20 minutes afterwards, and to report any adverse reaction to me immediately.   [address]: [id number]  Lot:-[id number]  Exp: 01/01/2022

## 2021-08-26 ENCOUNTER — APPOINTMENT (OUTPATIENT)
Dept: CT IMAGING | Age: 26
End: 2021-08-26
Payer: MEDICAID

## 2021-08-26 ENCOUNTER — HOSPITAL ENCOUNTER (OUTPATIENT)
Dept: ULTRASOUND IMAGING | Age: 26
Discharge: HOME | End: 2021-08-26
Payer: MEDICAID

## 2021-08-26 ENCOUNTER — HOSPITAL ENCOUNTER (EMERGENCY)
Age: 26
Discharge: HOME | End: 2021-08-26
Payer: MEDICAID

## 2021-08-26 VITALS
TEMPERATURE: 98.4 F | BODY MASS INDEX: 27.62 KG/M2 | SYSTOLIC BLOOD PRESSURE: 111 MMHG | DIASTOLIC BLOOD PRESSURE: 68 MMHG | RESPIRATION RATE: 16 BRPM | WEIGHT: 176 LBS | OXYGEN SATURATION: 100 % | HEART RATE: 68 BPM | HEIGHT: 67 IN

## 2021-08-26 DIAGNOSIS — R10.2 PELVIC PAIN: ICD-10-CM

## 2021-08-26 DIAGNOSIS — R10.2 PELVIC PAIN: Primary | ICD-10-CM

## 2021-08-26 DIAGNOSIS — N83.209 CYST OF OVARY, UNSPECIFIED LATERALITY: ICD-10-CM

## 2021-08-26 DIAGNOSIS — N10 SUBACUTE PYELONEPHRITIS: Primary | ICD-10-CM

## 2021-08-26 LAB
ANION GAP SERPL CALCULATED.3IONS-SCNC: 9 MMOL/L (ref 7–19)
BACTERIA: ABNORMAL /HPF
BASOPHILS ABSOLUTE: 0 K/UL (ref 0–0.2)
BASOPHILS RELATIVE PERCENT: 0.4 % (ref 0–1)
BILIRUBIN URINE: NEGATIVE
BLOOD, URINE: NEGATIVE
BUN BLDV-MCNC: 12 MG/DL (ref 6–20)
CALCIUM SERPL-MCNC: 8.9 MG/DL (ref 8.6–10)
CHLORIDE BLD-SCNC: 106 MMOL/L (ref 98–111)
CLARITY: CLEAR
CO2: 24 MMOL/L (ref 22–29)
COLOR: YELLOW
CREAT SERPL-MCNC: 0.8 MG/DL (ref 0.5–0.9)
CRYSTALS, UA: ABNORMAL /HPF
EOSINOPHILS ABSOLUTE: 0.1 K/UL (ref 0–0.6)
EOSINOPHILS RELATIVE PERCENT: 1 % (ref 0–5)
EPITHELIAL CELLS, UA: 5 /HPF (ref 0–5)
GFR AFRICAN AMERICAN: >59
GFR NON-AFRICAN AMERICAN: >60
GLUCOSE BLD-MCNC: 81 MG/DL (ref 74–109)
GLUCOSE URINE: NEGATIVE MG/DL
HCG QUALITATIVE: NEGATIVE
HCT VFR BLD CALC: 38.6 % (ref 37–47)
HEMOGLOBIN: 12.1 G/DL (ref 12–16)
HYALINE CASTS: 5 /HPF (ref 0–8)
IMMATURE GRANULOCYTES #: 0 K/UL
KETONES, URINE: ABNORMAL MG/DL
LEUKOCYTE ESTERASE, URINE: ABNORMAL
LIPASE: 17 U/L (ref 13–60)
LYMPHOCYTES ABSOLUTE: 2.2 K/UL (ref 1.1–4.5)
LYMPHOCYTES RELATIVE PERCENT: 30.1 % (ref 20–40)
MCH RBC QN AUTO: 31.1 PG (ref 27–31)
MCHC RBC AUTO-ENTMCNC: 31.3 G/DL (ref 33–37)
MCV RBC AUTO: 99.2 FL (ref 81–99)
MONOCYTES ABSOLUTE: 0.7 K/UL (ref 0–0.9)
MONOCYTES RELATIVE PERCENT: 9.8 % (ref 0–10)
NEUTROPHILS ABSOLUTE: 4.2 K/UL (ref 1.5–7.5)
NEUTROPHILS RELATIVE PERCENT: 58.6 % (ref 50–65)
NITRITE, URINE: POSITIVE
PDW BLD-RTO: 13.2 % (ref 11.5–14.5)
PH UA: 5.5 (ref 5–8)
PLATELET # BLD: 217 K/UL (ref 130–400)
PMV BLD AUTO: 10.8 FL (ref 9.4–12.3)
POTASSIUM SERPL-SCNC: 3.7 MMOL/L (ref 3.5–5)
PROTEIN UA: ABNORMAL MG/DL
RBC # BLD: 3.89 M/UL (ref 4.2–5.4)
RBC UA: 3 /HPF (ref 0–4)
SODIUM BLD-SCNC: 139 MMOL/L (ref 136–145)
SPECIFIC GRAVITY UA: 1.02 (ref 1–1.03)
UROBILINOGEN, URINE: 1 E.U./DL
WBC # BLD: 7.2 K/UL (ref 4.8–10.8)
WBC UA: 11 /HPF (ref 0–5)

## 2021-08-26 PROCEDURE — 83690 ASSAY OF LIPASE: CPT

## 2021-08-26 PROCEDURE — 2580000003 HC RX 258: Performed by: NURSE PRACTITIONER

## 2021-08-26 PROCEDURE — 96375 TX/PRO/DX INJ NEW DRUG ADDON: CPT

## 2021-08-26 PROCEDURE — 93005 ELECTROCARDIOGRAM TRACING: CPT | Performed by: NURSE PRACTITIONER

## 2021-08-26 PROCEDURE — 80048 BASIC METABOLIC PNL TOTAL CA: CPT

## 2021-08-26 PROCEDURE — 87186 SC STD MICRODIL/AGAR DIL: CPT

## 2021-08-26 PROCEDURE — 81001 URINALYSIS AUTO W/SCOPE: CPT

## 2021-08-26 PROCEDURE — 85025 COMPLETE CBC W/AUTO DIFF WBC: CPT

## 2021-08-26 PROCEDURE — C9113 INJ PANTOPRAZOLE SODIUM, VIA: HCPCS | Performed by: NURSE PRACTITIONER

## 2021-08-26 PROCEDURE — 6360000004 HC RX CONTRAST MEDICATION: Performed by: NURSE PRACTITIONER

## 2021-08-26 PROCEDURE — 96365 THER/PROPH/DIAG IV INF INIT: CPT

## 2021-08-26 PROCEDURE — 36415 COLL VENOUS BLD VENIPUNCTURE: CPT

## 2021-08-26 PROCEDURE — 87086 URINE CULTURE/COLONY COUNT: CPT

## 2021-08-26 PROCEDURE — 72131 CT LUMBAR SPINE W/O DYE: CPT

## 2021-08-26 PROCEDURE — 99285 EMERGENCY DEPT VISIT HI MDM: CPT

## 2021-08-26 PROCEDURE — 84703 CHORIONIC GONADOTROPIN ASSAY: CPT

## 2021-08-26 PROCEDURE — 76830 TRANSVAGINAL US NON-OB: CPT

## 2021-08-26 PROCEDURE — 6360000002 HC RX W HCPCS: Performed by: NURSE PRACTITIONER

## 2021-08-26 PROCEDURE — 74177 CT ABD & PELVIS W/CONTRAST: CPT

## 2021-08-26 RX ORDER — SODIUM CHLORIDE, SODIUM LACTATE, POTASSIUM CHLORIDE, CALCIUM CHLORIDE 600; 310; 30; 20 MG/100ML; MG/100ML; MG/100ML; MG/100ML
1000 INJECTION, SOLUTION INTRAVENOUS ONCE
Status: COMPLETED | OUTPATIENT
Start: 2021-08-26 | End: 2021-08-26

## 2021-08-26 RX ORDER — MORPHINE SULFATE 4 MG/ML
2 INJECTION, SOLUTION INTRAMUSCULAR; INTRAVENOUS ONCE
Status: COMPLETED | OUTPATIENT
Start: 2021-08-26 | End: 2021-08-26

## 2021-08-26 RX ORDER — HYDROCODONE BITARTRATE AND ACETAMINOPHEN 5; 325 MG/1; MG/1
1 TABLET ORAL EVERY 6 HOURS PRN
Qty: 12 TABLET | Refills: 0 | Status: SHIPPED | OUTPATIENT
Start: 2021-08-26 | End: 2021-08-29

## 2021-08-26 RX ORDER — PROMETHAZINE HYDROCHLORIDE 25 MG/ML
6.25 INJECTION, SOLUTION INTRAMUSCULAR; INTRAVENOUS ONCE
Status: COMPLETED | OUTPATIENT
Start: 2021-08-26 | End: 2021-08-26

## 2021-08-26 RX ORDER — CEFDINIR 300 MG/1
300 CAPSULE ORAL 2 TIMES DAILY
Qty: 20 CAPSULE | Refills: 0 | Status: SHIPPED | OUTPATIENT
Start: 2021-08-26 | End: 2021-08-31 | Stop reason: SDUPTHER

## 2021-08-26 RX ORDER — PANTOPRAZOLE SODIUM 40 MG/10ML
40 INJECTION, POWDER, LYOPHILIZED, FOR SOLUTION INTRAVENOUS DAILY
Status: DISCONTINUED | OUTPATIENT
Start: 2021-08-26 | End: 2021-08-26 | Stop reason: HOSPADM

## 2021-08-26 RX ORDER — AZITHROMYCIN 250 MG/1
250 TABLET, FILM COATED ORAL SEE ADMIN INSTRUCTIONS
Qty: 6 TABLET | Refills: 0 | Status: SHIPPED | OUTPATIENT
Start: 2021-08-26 | End: 2021-08-26

## 2021-08-26 RX ORDER — SODIUM CHLORIDE 9 MG/ML
10 INJECTION INTRAVENOUS DAILY
Status: DISCONTINUED | OUTPATIENT
Start: 2021-08-26 | End: 2021-08-26 | Stop reason: HOSPADM

## 2021-08-26 RX ORDER — ONDANSETRON 4 MG/1
4 TABLET, ORALLY DISINTEGRATING ORAL EVERY 8 HOURS PRN
Qty: 10 TABLET | Refills: 0 | Status: SHIPPED | OUTPATIENT
Start: 2021-08-26

## 2021-08-26 RX ORDER — MORPHINE SULFATE 4 MG/ML
2 INJECTION, SOLUTION INTRAMUSCULAR; INTRAVENOUS ONCE
Status: DISCONTINUED | OUTPATIENT
Start: 2021-08-26 | End: 2021-08-26 | Stop reason: HOSPADM

## 2021-08-26 RX ORDER — DOXYCYCLINE HYCLATE 100 MG
100 TABLET ORAL 2 TIMES DAILY
Qty: 14 TABLET | Refills: 0 | Status: SHIPPED | OUTPATIENT
Start: 2021-08-26 | End: 2021-08-31 | Stop reason: SDUPTHER

## 2021-08-26 RX ADMIN — CEFTRIAXONE 1000 MG: 1 INJECTION, POWDER, FOR SOLUTION INTRAMUSCULAR; INTRAVENOUS at 16:49

## 2021-08-26 RX ADMIN — MORPHINE SULFATE 2 MG: 4 INJECTION, SOLUTION INTRAMUSCULAR; INTRAVENOUS at 14:44

## 2021-08-26 RX ADMIN — SODIUM CHLORIDE, POTASSIUM CHLORIDE, SODIUM LACTATE AND CALCIUM CHLORIDE 1000 ML: 600; 310; 30; 20 INJECTION, SOLUTION INTRAVENOUS at 14:53

## 2021-08-26 RX ADMIN — SODIUM CHLORIDE, PRESERVATIVE FREE 10 ML: 5 INJECTION INTRAVENOUS at 14:46

## 2021-08-26 RX ADMIN — PROMETHAZINE HYDROCHLORIDE 6.25 MG: 25 INJECTION INTRAMUSCULAR; INTRAVENOUS at 14:46

## 2021-08-26 RX ADMIN — IOPAMIDOL 95 ML: 755 INJECTION, SOLUTION INTRAVENOUS at 15:43

## 2021-08-26 RX ADMIN — PANTOPRAZOLE SODIUM 40 MG: 40 INJECTION, POWDER, FOR SOLUTION INTRAVENOUS at 14:47

## 2021-08-26 ASSESSMENT — ENCOUNTER SYMPTOMS
NAUSEA: 1
ABDOMINAL PAIN: 1
VOMITING: 1

## 2021-08-26 ASSESSMENT — PAIN SCALES - GENERAL
PAINLEVEL_OUTOF10: 5
PAINLEVEL_OUTOF10: 6
PAINLEVEL_OUTOF10: 1

## 2021-08-26 ASSESSMENT — PAIN DESCRIPTION - LOCATION: LOCATION: BACK;ABDOMEN

## 2021-08-26 ASSESSMENT — PAIN DESCRIPTION - DESCRIPTORS: DESCRIPTORS: RADIATING

## 2021-08-26 NOTE — ED PROVIDER NOTES
[address] [facility] EMERGENCY DEPT  eMERGENCY dEPARTMENT eNCOUnter      Pt Name: Federico Story  MRN: [id number]  Jose Davidgfnic 1995  Date of evaluation: 8/26/2021  Provider: Juwan Ca, [address]       Chief Complaint   Patient presents with    Back Pain     mid lower back radiating to ab, seen at [address] [facility] Monday    Abdominal Pain         HISTORY OF PRESENT ILLNESS   (Location/Symptom, Timing/Onset,Context/Setting, Quality, Duration, Modifying Factors, Severity)  Note limiting factors. Sujit Ortez a 27 y.o. female who presents to the emergency department for evaluation of back and abdominal pain. Pt tells me that she has had left lower back and left flank pain that began 6 days ago worsening today. She tells me that she was evaluated 4 days ago with labs/abdominal ultrasound and CT abd/pelvis with noted ovarian cysts per patient. She was reevaluated by her gynecologist today with pelvis US and labs sent here for further evaluation. She denies fevers. She has had episodes of nausea, vomiting today. She denies dysuria, saddle anesthesia, diarrhea as well as lower extremity numbness/weakness. She denies previous abdominal surgery. She tells me that she received toradol today that seemed to improve symptoms somewhat. HPI    Nursing Notes were reviewed. REVIEW OF SYSTEMS    (2-9 systems for level 4, 10 or more for level 5)     Review of Systems   Constitutional: Negative for fever. Gastrointestinal: Positive for abdominal pain, nausea and vomiting. Genitourinary: Positive for flank pain. All other systems reviewed and are negative. A complete review of systems was performed and is negative except as noted above in the HPI.        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 [address] 2015         SURGICAL HISTORY       Past Surgical History:   Procedure Laterality Date    WISDOM TOOTH EXTRACTION  2013 CURRENT MEDICATIONS       Previous Medications    DOXYCYCLINE HYCLATE (VIBRA-TABS) 100 MG TABLET    Take 1 tablet by mouth 2 times daily for 7 days    IBUPROFEN (ADVIL;MOTRIN) 800 MG TABLET    TAKE 1 TABLET BY MOUTH EVERY 8 HOURS AS NEEDED FOR PAIN       ALLERGIES     Patient has no known allergies. FAMILY HISTORY     History reviewed. No pertinent family history. SOCIAL HISTORY       Social History     Socioeconomic History    Marital status:      Spouse name: None    Number of children: None    Years of education: None    Highest education level: None   Occupational History    None   Tobacco Use    Smoking status: Never Smoker    Smokeless tobacco: Never Used   Vaping Use    Vaping Use: Never used   Substance and Sexual Activity    Alcohol use: No    Drug use: No    Sexual activity: Yes     Partners: Male     Birth control/protection: I.U.D. Other Topics Concern    None   Social History Narrative    None     Social Determinants of Health     Financial Resource Strain:     Difficulty of Paying Living Expenses:    Food Insecurity:     Worried About Running Out of Food in the Last Year:     [address] in the Last Year:    Transportation Needs:     Lack of Transportation (Medical):      Lack of Transportation (Non-Medical):    Physical Activity:     Days of Exercise per Week:     Minutes of Exercise per Session:    Stress:     Feeling of Stress :    Social Connections:     Frequency of Communication with Friends and Family:     Frequency of Social Gatherings with Friends and Family:     Attends [employer] Services:     Active Member of Clubs or Organizations:     Attends Club or Organization Meetings:     Marital Status:    Intimate Partner Violence:     Fear of Current or Ex-Partner:     Emotionally Abused:     Physically Abused:     Sexually Abused:        SCREENINGS    [place] Coma Scale  Eye Opening: Spontaneous  Best Verbal Response: Oriented  Best Motor Response: Obeys commands  Linda Coma Scale Score: 15        PHYSICAL EXAM    (up to 7 for level 4, 8 or more for level 5)     ED Triage Vitals [08/26/21 1335]   BP Temp Temp src Pulse Resp SpO2 Height Weight   109/79 98.4 °F (36.9 °C) -- 95 17 98 % 5' 7\" (1.702 m) 176 lb (79.8 kg)       Physical Exam  HENT:      Head: Normocephalic. Right Ear: External ear normal.      Left Ear: External ear normal.   Eyes:      Conjunctiva/sclera: Conjunctivae normal.      Pupils: Pupils are equal, round, and reactive to light. Cardiovascular:      Rate and Rhythm: Normal rate and regular rhythm. Heart sounds: Normal heart sounds. Pulmonary:      Effort: Pulmonary effort is normal.      Breath sounds: Normal breath sounds. Abdominal:      General: Bowel sounds are normal.      Palpations: Abdomen is soft. Tenderness: There is abdominal tenderness in the epigastric area and left upper quadrant. Musculoskeletal:         General: Normal range of motion. Cervical back: Normal range of motion. Comments: No direct ttp cervical spine  Left sided CVA ttp  5/5 MS equal bilateral lower legs   Skin:     General: Skin is warm and dry. Neurological:      Mental Status: She is alert and oriented to person, place, and time. DIAGNOSTIC RESULTS     EKG: All EKG's are interpreted by the Emergency Department Physician who either signs or Co-signs this chart in the absence of acardiologist.  There is a regular rate and rhythm. SR hr 79b/min Normal MN interval and normal P waves. Normal QRS interval. Normal QT interval. No obvious ST elevation or ST depression.          RADIOLOGY:   Non-plain film images such as CT, Ultrasound andMRI are read by the radiologist. Plain radiographic images are visualized and preliminarily interpreted by the emergency physician with the below findings:        Interpretation per the Radiologist below, if available at the time of this note:    Kelly Additional Contrast? None   Final Result   1. Question mild hepatic steatosis. 2. Moderate amount of stool and gas throughout the colon. 3. Trace amount free fluid in the pelvic cul-de-sac compatible with   normal physiology. 3. No CT evidence of acute intra-abdominal/pelvic pathological   process. Signed by Dr Bryanna Mari   Final Result   No acute bony abnormality. Moderate degeneration of the intervertebral disc at L5-S1. The neural foramina spinal canal are patent. A mild levoscoliosis. Signed by Dr Jayjay Lassiter            ED BEDSIDE ULTRASOUND:   Performed by ED Physician - none    LABS:  Labs Reviewed   CBC WITH AUTO DIFFERENTIAL - Abnormal; Notable for the following components:       Result Value    RBC 3.89 (*)     MCV 99.2 (*)     MCH 31.1 (*)     MCHC 31.3 (*)     All other components within normal limits   URINE RT REFLEX TO CULTURE - Abnormal; Notable for the following components:    Ketones, Urine TRACE (*)     Protein, UA TRACE (*)     Nitrite, Urine POSITIVE (*)     Leukocyte Esterase, Urine SMALL (*)     All other components within normal limits   MICROSCOPIC URINALYSIS - Abnormal; Notable for the following components:    Bacteria, UA 4+ (*)     Crystals, UA NEG (*)     WBC, UA 11 (*)     All other components within normal limits   CULTURE, URINE   LIPASE   HCG, SERUM, QUALITATIVE   BASIC METABOLIC PANEL       All other labs were within normal range or not returned as of this dictation.     RE-ASSESSMENT           EMERGENCY DEPARTMENT COURSE and DIFFERENTIALDIAGNOSIS/MDM:   Vitals:    Vitals:    08/26/21 1335 08/26/21 1450 08/26/21 1451 08/26/21 1611   BP: 109/79  110/80 121/86   Pulse: 95 67  65   Resp: 17 17 17 16   Temp: 98.4 °F (36.9 °C)      SpO2: 98% 98% 96% 100%   Weight: 176 lb (79.8 kg)      Height: 5' 7\" (1.702 m)          MDM      CONSULTS:  None    PROCEDURES:  Unless otherwise notedbelow, none     Procedures    FINAL IMPRESSION     1. Subacute pyelonephritis          DISPOSITION/PLAN   DISPOSITION Decision To Discharge 08/26/2021 06:17:50 PM      PATIENT REFERRED TO:  [facility]  [address] [facility]. [address] [place] [id number] [phone]  Schedule an appointment as soon as possible for a visit in 4 days        DISCHARGE MEDICATIONS:    Attestation: The Prescription Monitoring Report for this patient was reviewed today. ([id number]) SJ Gamboa)  Periodic Controlled Substance Monitoring: Possible medication side effects, risk of tolerance/dependence & alternative treatments discussed., No signs of potential drug abuse or diversion identified. SJ Gamboa)  Current Discharge Medication List           Medication List      START taking these medications    cefdinir 300 MG capsule  Commonly known as: OMNICEF  Take 1 capsule by mouth 2 times daily for 10 days     HYDROcodone-acetaminophen 5-325 MG per tablet  Commonly known as: Norco  Take 1 tablet by mouth every 6 hours as needed for Pain for up to 3 days. Intended supply: 3 days.  Take lowest dose possible to manage pain     ondansetron 4 MG disintegrating tablet  Commonly known as: Zofran ODT  Take 1 tablet by mouth every 8 hours as needed for Nausea or Vomiting        ASK your doctor about these medications    doxycycline hyclate 100 MG tablet  Commonly known as: VIBRA-TABS  Take 1 tablet by mouth 2 times daily for 7 days     ibuprofen 800 MG tablet  Commonly known as: ADVIL;MOTRIN  TAKE 1 TABLET BY MOUTH EVERY 8 HOURS AS NEEDED FOR PAIN           Where to Get Your Medications      These medications were sent to [facility]/pharmacy [address] [place] [address], [address] [address]    Phone: [phone]   · cefdinir 300 MG capsule  · HYDROcodone-acetaminophen 5-325 MG per tablet  · ondansetron 4 MG disintegrating tablet           (Pleasenote that portions of this note were completed with a voice recognition program. Efforts were made to edit the dictations but occasionally words are mis-transcribed.)              SJ Ennis  08/26/21 1837

## 2021-08-26 NOTE — DISCHARGE INSTR - COC
Continuity of Care Form    Patient Name: Federico Story   :  1995  MRN:  [id number]    Admit date:  2021  Discharge date:  ***    Code Status Order: Prior   Advance Directives:     Admitting Physician:  No admitting provider for patient encounter. PCP: No primary care provider on file. Discharging Nurse: [facility] Unit/Room#:   Discharging Unit Phone Number: ***    Emergency Contact:   Extended Emergency Contact Information  Primary Emergency Contact: Luis Chandler  Home Phone: [phone]  Mobile Phone: [phone]  Relation: Other  Preferred language: English   needed? No    Past Surgical History:  Past Surgical History:   Procedure Laterality Date    WISDOM TOOTH EXTRACTION         Immunization History: There is no immunization history on file for this patient.     Active Problems:  Patient Active Problem List   Diagnosis Code    IUD (intrauterine device) in place Z97.5       Isolation/Infection:   Isolation          No Isolation        Patient Infection Status     None to display          Nurse Assessment:  Last Vital Signs: /68   Pulse 68   Temp 98.4 °F (36.9 °C)   Resp 16   Ht 5' 7\" (1.702 m)   Wt 176 lb (79.8 kg)   SpO2 100%   BMI 27.57 kg/m²     Last documented pain score (0-10 scale): Pain Level: 1  Last Weight:   Wt Readings from Last 1 Encounters:   21 176 lb (79.8 kg)     Mental Status:  {IP PT MENTAL STATUS:}    IV Access:  { BLAKE IV ACCESS:095736830}    Nursing Mobility/ADLs:  Walking   {CHP DME NHZ}  Transfer  {CHP DME WBPB:586688832}  Bathing  {CHP DME EMYY:287029344}  Dressing  {CHP DME BIXF:113381805}  Toileting  {CHP DME BBDF:618617449}  Feeding  {CHP DME TNBZ:888129133}  Med Admin  {CHP DME ETBY:117897101}  Med Delivery   { BLAKE MED Delivery:063214389}    Wound Care Documentation and Therapy:        Elimination:  Continence:   · Bowel: {YES / U}  · Bladder: {YES / QB:83210}  Urinary Catheter: {Urinary Catheter:445547019}   Colostomy/Ileostomy/Ileal Conduit: {YES / LILIANA:87694}       Date of Last BM: ***    Intake/Output Summary (Last 24 hours) at 2021 1846  Last data filed at 2021 1649  Gross per 24 hour   Intake 1000 ml   Output --   Net 1000 ml     No intake/output data recorded.     Safety Concerns:     [address] BLAKE Safety Concerns:196586191}    Impairments/Disabilities:      [address] [facility] Impairments/Disabilities:386149761}    Nutrition Therapy:  Current Nutrition Therapy:   [facility] Diet List:713758642}    Routes of Feeding: {CHP DME Other Feedings:492905299}  Liquids: {Slp liquid thickness:26113}  Daily Fluid Restriction: {CHP DME Yes amt example:480571544}  Last Modified Barium Swallow with Video (Video Swallowing Test): {Done Not Done UHGP:804830055}    Treatments at the Time of Hospital Discharge:   Respiratory Treatments: ***  Oxygen Therapy:  {Therapy; copd oxygen:15066}  Ventilator:    { CC Vent FGBS:714532289}    Rehab Therapies: {THERAPEUTIC INTERVENTION:5623005034}  Weight Bearing Status/Restrictions: [address] [place] [address] Weight Bearin}  Other Medical Equipment (for information only, NOT a DME order):  {EQUIPMENT:153281669}  Other Treatments: ***    Patient's personal belongings (please select all that are sent with patient):  {[facility] DME Belongings:312590840}    RN SIGNATURE:  {Esignature:645444140}    CASE MANAGEMENT/SOCIAL WORK SECTION    Inpatient Status Date: ***    Readmission Risk Assessment Score:  Readmission Risk              Risk of Unplanned Readmission:  0           Discharging to Facility/ Agency   · Name:   · Address:  · Phone:  · Fax:    Dialysis Facility (if applicable)   · Name:  · Address:  · Dialysis Schedule:  · Phone:  · Fax:    / signature: {Esignature:474572678}    PHYSICIAN SECTION    Prognosis: {Prognosis:5939269577}    Condition at Discharge: [address] Patient Condition:406006248}    Rehab Potential (if transferring to Rehab): {Prognosis:2459439024}    Recommended Labs or Other Treatments After Discharge: ***    Physician Certification: I certify the above information and transfer of Dianna Garcia  is necessary for the continuing treatment of the diagnosis listed and that she requires {Admit to Appropriate Level of Care:71596} for {GREATER/LESS:924633108} 30 days.      Update Admission H&P: {CHP DME Changes in FEDJI:016551588}    PHYSICIAN SIGNATURE:  {Esignature:756308686}

## 2021-08-29 LAB
EKG P AXIS: 49 DEGREES
EKG P-R INTERVAL: 156 MS
EKG Q-T INTERVAL: 390 MS
EKG QRS DURATION: 88 MS
EKG QTC CALCULATION (BAZETT): 421 MS
EKG T AXIS: 45 DEGREES
ORGANISM: ABNORMAL
URINE CULTURE, ROUTINE: ABNORMAL
URINE CULTURE, ROUTINE: ABNORMAL

## 2021-08-29 PROCEDURE — 93010 ELECTROCARDIOGRAM REPORT: CPT | Performed by: INTERNAL MEDICINE

## 2021-08-31 RX ORDER — DOXYCYCLINE HYCLATE 100 MG
100 TABLET ORAL 2 TIMES DAILY
Qty: 14 TABLET | Refills: 0 | Status: SHIPPED | OUTPATIENT
Start: 2021-08-31 | End: 2021-09-07

## 2021-12-07 ENCOUNTER — TELEPHONE (OUTPATIENT)
Dept: OBGYN CLINIC | Age: 26
End: 2021-12-07

## 2021-12-15 ENCOUNTER — TELEMEDICINE (OUTPATIENT)
Dept: OBGYN CLINIC | Age: 26
End: 2021-12-15
Payer: MEDICAID

## 2021-12-15 DIAGNOSIS — L65.9 HAIR LOSS: ICD-10-CM

## 2021-12-15 DIAGNOSIS — E34.9 HORMONE IMBALANCE: Primary | ICD-10-CM

## 2021-12-15 PROCEDURE — 99213 OFFICE O/P EST LOW 20 MIN: CPT | Performed by: ADVANCED PRACTICE MIDWIFE

## 2021-12-15 ASSESSMENT — ENCOUNTER SYMPTOMS
ALLERGIC/IMMUNOLOGIC NEGATIVE: 1
EYES NEGATIVE: 1
ROS SKIN COMMENTS: HAIR LOSS
GASTROINTESTINAL NEGATIVE: 1
RESPIRATORY NEGATIVE: 1

## 2021-12-15 NOTE — PROGRESS NOTES
[facility]  CNM Office Note  TELEHEALTH EVALUATION -- Audio/Visual (During DBRMN-45 public health emergency)    Fabio Lopez is a 27 y.o. female who presents today for her medical conditions/ complaints as noted below. No chief complaint on file. HPI  Miles signed on for VV. She c/o hair loss x 2-3 weeks. She reports covid infection 3-4 months ago. Hair loss is evenly distributed. No additional symptoms. Patient Active Problem List   Diagnosis    IUD (intrauterine device) in place       No LMP recorded. (Menstrual status: Other - See Notes). 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 Social History     Tobacco Use    Smoking status: Never Smoker    Smokeless tobacco: Never Used   Substance Use Topics    Alcohol use: No       Current Outpatient Medications   Medication Sig Dispense Refill    ondansetron (ZOFRAN ODT) 4 MG disintegrating tablet Take 1 tablet by mouth every 8 hours as needed for Nausea or Vomiting 10 tablet 0    ibuprofen (ADVIL;MOTRIN) 800 MG tablet TAKE 1 TABLET BY MOUTH EVERY 8 HOURS AS NEEDED FOR PAIN 90 tablet 0     No current facility-administered medications for this visit. No Known Allergies  There were no vitals filed for this visit. There is no height or weight on file to calculate BMI. Review of Systems   Constitutional: Negative. HENT: Negative. Eyes: Negative. Respiratory: Negative. Cardiovascular: Negative. Gastrointestinal: Negative. Endocrine: Negative. Genitourinary: Negative. Musculoskeletal: Negative. Skin: Negative. Hair loss   Allergic/Immunologic: Negative. Neurological: Negative. Hematological: Negative. Psychiatric/Behavioral: Negative.         Due to this being a TeleHealth encounter, evaluation of the following organ systems is limited: Vitals/Constitutional/EENT/Resp/CV/GI//MS/Neuro/Skin/Heme-Lymph-Imm. Physical Exam  Constitutional:       Appearance: She is well-developed. She is not diaphoretic. HENT:      Head: Normocephalic and atraumatic. Nose: Nose normal.   Eyes:      Conjunctiva/sclera: Conjunctivae normal.      Pupils: Pupils are equal, round, and reactive to light. Neck:      Thyroid: No thyromegaly. Trachea: No tracheal deviation. Pulmonary:      Effort: Pulmonary effort is normal. No respiratory distress. Musculoskeletal:         General: Normal range of motion. Cervical back: Normal range of motion and neck supple. Comments: Normal ROM for upper and lower extremities. Gait steady. Skin:     General: Skin is warm and dry. Findings: No lesion or rash. Neurological:      Mental Status: She is alert and oriented to person, place, and time. Psychiatric:         Speech: Speech normal.         Behavior: Behavior normal.          Diagnosis Orders   1. Hormone imbalance         MEDICATIONS:  No orders of the defined types were placed in this encounter. ORDERS:  No orders of the defined types were placed in this encounter. PLAN:  1. Reassurance given. 2. Labs ordered and if not improving will plan testing. Over 50% of the total visit time of  minutes was spent on counseling and/or coordination of care of 20. All questions were answered and the patient voiced understanding. Pursuant to the emergency declaration under the [facility], [facility] waiver authority and the [employer] Act, this Virtual  Visit was conducted, with patient's consent, to reduce the patient's risk of exposure to COVID-19 and provide continuity of care for an established patient.     Services were provided through a video synchronous discussion virtually to substitute for in-person clinic visit.

## 2022-07-22 NOTE — PATIENT INSTRUCTIONS
Patient Education        Well Visit, Ages 25 to 48: Care Instructions  Overview     Well visits can help you stay healthy. Your doctor has checked your overall health and may have suggested ways to take good care of yourself. Your doctor also may have recommended tests. At home, you can help prevent illness withhealthy eating, regular exercise, and other steps. Follow-up care is a key part of your treatment and safety. Be sure to make and go to all appointments, and call your doctor if you are having problems. It's also a good idea to know your test results and keep alist of the medicines you take. How can you care for yourself at home? Get screening tests that you and your doctor decide on. Screening helps find diseases before any symptoms appear. Eat healthy foods. Choose fruits, vegetables, whole grains, protein, and low-fat dairy foods. Limit fat, especially saturated fat. Reduce salt in your diet. Limit alcohol. If you are a man, have no more than 2 drinks a day or 14 drinks a week. If you are a woman, have no more than 1 drink a day or 7 drinks a week. Get at least 30 minutes of physical activity on most days of the week. Walking is a good choice. You also may want to do other activities, such as running, swimming, cycling, or playing tennis or team sports. Discuss any changes in your exercise program with your doctor. Reach and stay at a healthy weight. This will lower your risk for many problems, such as obesity, diabetes, heart disease, and high blood pressure. Do not smoke or allow others to smoke around you. If you need help quitting, talk to your doctor about stop-smoking programs and medicines. These can increase your chances of quitting for good. Care for your mental health. It is easy to get weighed down by worry and stress. Learn strategies to manage stress, like deep breathing and mindfulness, and stay connected with your family and community.  If you find you often feel sad or hopeless, talk with your doctor. Treatment can help. Talk to your doctor about whether you have any risk factors for sexually transmitted infections (STIs). You can help prevent STIs if you wait to have sex with a new partner (or partners) until you've each been tested for STIs. It also helps if you use condoms (male or female condoms) and if you limit your sex partners to one person who only has sex with you. Vaccines are available for some STIs, such as HPV. Use birth control if it's important to you to prevent pregnancy. Talk with your doctor about the choices available and what might be best for you. If you think you may have a problem with alcohol or drug use, talk to your doctor. This includes prescription medicines (such as amphetamines and opioids) and illegal drugs (such as cocaine and methamphetamine). Your doctor can help you figure out what type of treatment is best for you. Protect your skin from too much sun. When you're outdoors from 10 a.m. to 4 p.m., stay in the shade or cover up with clothing and a hat with a wide brim. Wear sunglasses that block UV rays. Even when it's cloudy, put broad-spectrum sunscreen (SPF 30 or higher) on any exposed skin. See a dentist one or two times a year for checkups and to have your teeth cleaned. Wear a seat belt in the car. When should you call for help? Watch closely for changes in your health, and be sure to contact your doctor if you have any problems or symptoms that concern you. Where can you learn more? Go to https://parker.[email or web address] org and sign in to your [employer] account. Enter P072 in the [employer] box to learn more about \"Well Visit, Ages 25 to 48: Care Instructions. \"     If you do not have an account, please click on the \"Sign Up Now\" link. Current as of: October 6, 2021               Content Version: 13.3  © 8923-9523 Healthwise, Incorporated. Care instructions adapted under license by [facility] ([facility]).  If you have questions about a medical condition or this instruction, always ask your healthcare professional. [address] any warranty or liability for your use of this information. Patient Education        Breast Self-Exam: Care Instructions  Your Care Instructions     A breast self-exam is when you check your breasts for lumps or changes. This regular exam helps you learn how your breasts normally look and feel. Mostbreast problems or changes are not because of cancer. Breast self-exam is not a substitute for a mammogram. Having regular breast exams by your doctor and regular mammograms improve your chances of finding anyproblems with your breasts. Some women set a time each month to do a step-by-step breast self-exam. Other women like a less formal system. They might look at their breasts as they brushtheir teeth, or feel their breasts once in a while in the shower. If you notice a change in your breast, tell your doctor. Follow-up care is a key part of your treatment and safety. Be sure to make and go to all appointments, and call your doctor if you are having problems. It's also a good idea to know your test results and keep alist of the medicines you take. How do you do a breast self-exam?  The best time to examine your breasts is usually one week after your menstrual period begins. Your breasts should not be tender then. If you do not have periods, you might do your exam on a day of the month that is easy to remember. To examine your breasts:  Remove all your clothes above the waist and lie down. When you are lying down, your breast tissue spreads evenly over your chest wall, which makes it easier to feel all your breast tissue. Use the pads--not the fingertips--of the 3 middle fingers of your left hand to check your right breast. Move your fingers slowly in small coin-sized circles that overlap. Use three levels of pressure to feel of all your breast tissue.  Use light pressure to feel the tissue close to the skin surface. Use medium pressure to feel a little deeper. Use firm pressure to feel your tissue close to your breastbone and ribs. Use each pressure level to feel your breast tissue before moving on to the next spot. Check your entire breast, moving up and down as if following a strip from the collarbone to the bra line, and from the armpit to the ribs. Repeat until you have covered the entire breast.  Repeat this procedure for your left breast, using the pads of the 3 middle fingers of your right hand. To examine your breasts while in the shower:  Place one arm over your head and lightly soap your breast on that side. Using the pads of your fingers, gently move your hand over your breast (in the strip pattern described above), feeling carefully for any lumps or changes. Repeat for the other breast.  Have your doctor inspect anything you notice to see if you need further testing. Where can you learn more? Go to [email or web address] and sign in to your [employer] account. Enter P148 in the [employer] box to learn more about \"Breast Self-Exam: Care Instructions. \"     If you do not have an account, please click on the \"Sign Up Now\" link. Current as of: September 8, 2021               Content Version: 13.3  © 2006-2022 Healthwise, Incorporated. Care instructions adapted under license by [facility] ([facility]). If you have questions about a medical condition or this instruction, always ask your healthcare professional. [place] [address] any warranty or liability for your use of this information.

## 2022-07-25 ENCOUNTER — OFFICE VISIT (OUTPATIENT)
Dept: OBGYN CLINIC | Age: 27
End: 2022-07-25
Payer: MEDICAID

## 2022-07-25 VITALS
DIASTOLIC BLOOD PRESSURE: 64 MMHG | BODY MASS INDEX: 27 KG/M2 | HEIGHT: 67 IN | WEIGHT: 172 LBS | SYSTOLIC BLOOD PRESSURE: 105 MMHG | HEART RATE: 89 BPM

## 2022-07-25 DIAGNOSIS — Z30.431 INTRAUTERINE DEVICE SURVEILLANCE: ICD-10-CM

## 2022-07-25 DIAGNOSIS — Z01.419 ENCOUNTER FOR WELL WOMAN EXAM WITH ROUTINE GYNECOLOGICAL EXAM: Primary | ICD-10-CM

## 2022-07-25 DIAGNOSIS — Z12.4 SCREENING FOR CERVICAL CANCER: ICD-10-CM

## 2022-07-25 PROCEDURE — 99395 PREV VISIT EST AGE 18-39: CPT | Performed by: ADVANCED PRACTICE MIDWIFE

## 2022-07-25 ASSESSMENT — ENCOUNTER SYMPTOMS
ALLERGIC/IMMUNOLOGIC NEGATIVE: 1
GASTROINTESTINAL NEGATIVE: 1
EYES NEGATIVE: 1
RESPIRATORY NEGATIVE: 1

## 2022-07-25 NOTE — PROGRESS NOTES
[facility]  CNM Office Note    Armen Denver is a 32 y.o. female who presents today for her medical conditions/ complaints as noted below. Chief Complaint   Patient presents with    Gynecologic Exam     Patient presents today for a pap smear and breast exam. She is wanting a pap today. Medication Management    Contraception     Patient has the mirena, and has had it in for almost 4 years. But will it last for 7 or 5 years? HPI  Santiago Lopez presents for annual gyn exam. She is doing well and has no complaints. IUD in place - desires to continue. No sexual dysfunction. Patient Active Problem List   Diagnosis    IUD (intrauterine device) in place       No LMP recorded (lmp unknown). (Menstrual status: Other - See Notes). [id number]    Past Medical History:   Diagnosis Date    Rheu arthritis of unsp knee w involv of organs and systems ([facility] [address] 2015     Past Surgical History:   Procedure Laterality Date    WISDOM TOOTH EXTRACTION  2013     History reviewed. No pertinent family history. Social History     Tobacco Use    Smoking status: Never    Smokeless tobacco: Never   Substance Use Topics    Alcohol use: No       Current Outpatient Medications   Medication Sig Dispense Refill    ondansetron (ZOFRAN ODT) 4 MG disintegrating tablet Take 1 tablet by mouth every 8 hours as needed for Nausea or Vomiting 10 tablet 0    ibuprofen (ADVIL;MOTRIN) 800 MG tablet TAKE 1 TABLET BY MOUTH EVERY 8 HOURS AS NEEDED FOR PAIN 90 tablet 0     No current facility-administered medications for this visit. No Known Allergies  Vitals:    07/25/22 1039   BP: 105/64   Site: Left Upper Arm   Position: Sitting   Cuff Size: Medium Adult   Pulse: 89   Weight: 172 lb (78 kg)   Height: 5' 7\" (1.702 m)     Body mass index is 26.94 kg/m². Review of Systems   Constitutional: Negative. HENT: Negative. Eyes: Negative. Respiratory: Negative. Cardiovascular: Negative. Gastrointestinal: Negative.     Endocrine: Negative. Genitourinary: Negative. Musculoskeletal: Negative. Skin: Negative. Allergic/Immunologic: Negative. Neurological: Negative. Hematological: Negative. Psychiatric/Behavioral: Negative. Physical Exam  Constitutional:       Appearance: She is well-developed. HENT:      Head: Normocephalic and atraumatic. Eyes:      Conjunctiva/sclera: Conjunctivae normal.      Pupils: Pupils are equal, round, and reactive to light. Neck:      Thyroid: No thyromegaly. Trachea: No tracheal deviation. Cardiovascular:      Rate and Rhythm: Normal rate and regular rhythm. Heart sounds: Normal heart sounds. No murmur heard. Pulmonary:      Effort: Pulmonary effort is normal. No respiratory distress. Breath sounds: Normal breath sounds. Chest:      Comments: Breasts symmetrical without tenderness, masses, or nipple discharge. Nipples everted bilaterally. Abdominal:      General: There is no distension. Palpations: Abdomen is soft. Tenderness: There is no abdominal tenderness. There is no guarding. Genitourinary:     General: Normal vulva. Exam position: Lithotomy position. Labia:         Right: No rash or lesion. Left: No rash or lesion. Vagina: Normal. No erythema or lesions. Cervix: Normal.      Uterus: Normal. Not tender. Adnexa: Right adnexa normal and left adnexa normal.        Right: No mass, tenderness or fullness. Left: No mass, tenderness or fullness. Comments: IUD strings visualized  Musculoskeletal:         General: Normal range of motion. Cervical back: Normal range of motion and neck supple. Skin:     General: Skin is warm and dry. Capillary Refill: Capillary refill takes less than 2 seconds. Neurological:      Mental Status: She is alert and oriented to person, place, and time. Psychiatric:         Behavior: Behavior normal.         Thought Content:  Thought content normal. Judgment: Judgment normal.        Diagnosis Orders   1. Encounter for well woman exam with routine gynecological exam  PAP SMEAR      2. Intrauterine device surveillance        3. Screening for cervical cancer  PAP SMEAR          MEDICATIONS:  No orders of the defined types were placed in this encounter. ORDERS:  Orders Placed This Encounter   Procedures    PAP SMEAR       PLAN:  WWE - PAP collected. SBE reviewed and CBE performed. No annual labs today. IUD - IUD strings visualized.

## 2022-07-25 NOTE — PROGRESS NOTES
Pt presents today for pap smear and breast exam.    Last mammogram: never  Last pap smear: 2020, negative pap  Sexually active: Yes,  is currently out of town  Sexual preference: Male  Contraception: IUD  : 3  Para: 2  AB: 1  Last bone density: never   Last colonoscopy: never  Menarche: 13years old  LMP: unknown  Menses: she doesn't really have a full period, unless she stresses out. She spots some when she wipes, cramps about that time too.

## 2023-01-02 NOTE — PATIENT INSTRUCTIONS
Patient Education        progesterone  Pronunciation:  faheem monzon jalen  Brand:  First Progesterone MC10, Menopause Formula Progesterone, Prometrium  What is the most important information I should know about progesterone? You should not use progesterone if you have: abnormal vaginal bleeding, a history of breast cancer, liver disease, or if you have recently had a heart attack, stroke, or blood clot. Do not use if you are pregnant. Progesterone should not be used to prevent heart disease or dementia, because this medicine may actually increase your risk of developing these conditions. Using progesterone can increase your risk of blood clots, stroke, heart attack, or breast cancer. What is progesterone? Progesterone is a female hormone important for the regulation of ovulation and menstruation. Progesterone is used to cause menstrual periods in women who have not yet reached menopause but are not having periods due to a lack of progesterone in the body. Progesterone is also used to prevent overgrowth in the lining of the uterus in postmenopausal women who are receiving estrogen hormone replacement therapy. Progesterone should not be used to prevent heart disease or dementia, because this medicine may actually increase your risk of developing these conditions. Progesterone may also be used for purposes not listed in this medication guide. What should I discuss with my healthcare provider before using progesterone? You should not use progesterone if you are allergic to it, or if you have:  · abnormal vaginal bleeding that a doctor has not checked;  · a history of breast cancer;  · liver disease;  · a peanut allergy;  · if you are pregnant;  · if you have had a stroke, heart attack, or blood clot within the past year; or  · if you have recently had an incomplete miscarriage or \"missed\" . Using progesterone can increase your risk of blood clots, stroke, heart attack, or breast cancer.   To make sure this medicine is safe for you, tell your doctor if you have:  · heart disease, circulation problems;  · migraines;  · asthma;  · kidney disease;  · seizures or epilepsy;  · a history of depression; or  · risk factors for coronary artery disease (such as high blood pressure, diabetes, lupus, high cholesterol, family history of coronary artery disease, smoking, being overweight). Do not use progesterone if you are pregnant. It could harm the unborn baby. Tell your doctor if you are pregnant or plan to become pregnant. Progesterone can pass into breast milk and may harm a nursing baby. Tell your doctor if you are breast-feeding a baby. How should I use progesterone? Follow all directions on your prescription label. Do not use this medicine in larger or smaller amounts or for longer than recommended. Read all patient information, medication guides, and instruction sheets provided to you. Ask your doctor or pharmacist if you have any questions. Take the progesterone capsule with a full glass of water. It is best to take the medicine at night because progesterone can make you dizzy or drowsy. Apply progesterone cream to the skin as directed by your doctor. Progesterone is sometimes used for only a short time, such as 10 to 12 days during each menstrual cycle. Follow your doctor's dosing instructions very carefully. Have regular physical exams and self-examine your breasts for lumps on a monthly basis while using progesterone. If you need surgery or medical tests or if you will be on bed rest, you may need to stop using this medicine for a short time. Any doctor or surgeon who treats you should know that you are using progesterone. Store at room temperature away from moisture, heat, and light. What happens if I miss a dose? Use the missed dose as soon as you remember. Skip the missed dose if it is almost time for your next scheduled dose. Do not  use extra medicine to make up the missed dose.   Call your doctor if you miss more than one dose of this medication. What happens if I overdose? Seek emergency medical attention or call the Poison Help line at 1-245.846.9210. What should I avoid while using progesterone? Progesterone may impair your thinking or reactions. Be careful if you drive or do anything that requires you to be alert. What are the possible side effects of progesterone? Get emergency medical help if you have signs of an allergic reaction: hives; difficulty breathing; swelling of your face, lips, tongue, or throat. Call your doctor at once if you have:  · unusual vaginal bleeding;  · pain or burning when you urinate;  · a breast lump;  · sudden vision problems, severe headache or pain behind your eyes;  · symptoms of depression (sleep problems, weakness, mood changes);  · severe dizziness or drowsiness, spinning sensation, confusion, shortness of breath;  · heart attack symptoms --chest pain or pressure, pain spreading to your jaw or shoulder, nausea, sweating;  · liver problems --nausea, upper stomach pain, itching, tired feeling, loss of appetite, dark urine, christina-colored stools, jaundice (yellowing of the skin or eyes);  · signs of a stroke --sudden numbness or weakness (especially on one side of the body), sudden severe headache, slurred speech, problems with speech or balance;  · signs of a blood clot in the lung --chest pain, sudden cough, wheezing, rapid breathing, coughing up blood; or  · signs of a blood clot in your leg --pain, swelling, warmth, or redness in one or both legs. Common side effects may include:  · drowsiness, dizziness;  · breast pain;  · mood changes;  · headache;  · constipation, diarrhea, heartburn;  · bloating, swelling in your hands or feet;  · joint pain;  · hot flashes; or  · vaginal discharge. This is not a complete list of side effects and others may occur. Call your doctor for medical advice about side effects.  You may report side effects to FDA at 1-800-FDA-1088. What other drugs will affect progesterone? There may be other drugs that can interact with progesterone. Tell your doctor about all medications you use. This includes prescription, over-the-counter, vitamin, and herbal products. Do not start a new medication without telling your doctor. Where can I get more information? Your pharmacist can provide more information about progesterone. Remember, keep this and all other medicines out of the reach of children, never share your medicines with others, and use this medication only for the indication prescribed. Every effort has been made to ensure that the information provided by [address] is accurate, up-to-date, and complete, but no guarantee is made to that effect. Drug information contained herein may be time sensitive. [facility] information has been compiled for use by healthcare practitioners and consumers in the [place] and therefore [employer] does not warrant that uses outside of the [place] are appropriate, unless specifically indicated otherwise. [facility]'s drug information does not endorse drugs, diagnose patients or recommend therapy. [facility]'s drug information is an informational resource designed to assist licensed healthcare practitioners in caring for their patients and/or to serve consumers viewing this service as a supplement to, and not a substitute for, the expertise, skill, knowledge and judgment of healthcare practitioners. The absence of a warning for a given drug or drug combination in no way should be construed to indicate that the drug or drug combination is safe, effective or appropriate for any given patient. [employer] does not assume any responsibility for any aspect of healthcare administered with the aid of information [facility] provides.  The information contained herein is not intended to cover all possible uses, directions, precautions, warnings, drug interactions, allergic reactions, or adverse effects. If you have questions about the drugs you are taking, check with your doctor, nurse or pharmacist.  Copyright 7281-8217 [address]. Version: 9.01. Revision date: 3/26/2015. Care instructions adapted under license by [facility] ([facility]). If you have questions about a medical condition or this instruction, always ask your healthcare professional. [place] [address] any warranty or liability for your use of this information. 2 seconds or less

## 2023-06-01 ENCOUNTER — TELEPHONE (OUTPATIENT)
Dept: OBGYN CLINIC | Age: 28
End: 2023-06-01

## 2023-06-01 DIAGNOSIS — G89.29 CHRONIC INTRACTABLE HEADACHE, UNSPECIFIED HEADACHE TYPE: Primary | ICD-10-CM

## 2023-06-01 DIAGNOSIS — R51.9 CHRONIC INTRACTABLE HEADACHE, UNSPECIFIED HEADACHE TYPE: Primary | ICD-10-CM

## 2023-06-01 NOTE — TELEPHONE ENCOUNTER
Pt called stating she has had a headache that started 5/22 and hasn't gone away completely. Has taken OTC tylenol, ibuprofen, Excedrin with no relief. Does get worse some times, almost to the point of migraine. Does not have hx of migraines/CARRENO    Per [facility], pt needs to get CT of head wo contrast and we can go from there.

## 2023-07-31 ENCOUNTER — OFFICE VISIT (OUTPATIENT)
Dept: OBGYN CLINIC | Age: 28
End: 2023-07-31
Payer: MEDICAID

## 2023-07-31 VITALS
DIASTOLIC BLOOD PRESSURE: 80 MMHG | SYSTOLIC BLOOD PRESSURE: 108 MMHG | BODY MASS INDEX: 30.31 KG/M2 | WEIGHT: 200 LBS | HEIGHT: 68 IN

## 2023-07-31 DIAGNOSIS — Z11.51 ENCOUNTER FOR SCREENING FOR HUMAN PAPILLOMAVIRUS (HPV): ICD-10-CM

## 2023-07-31 DIAGNOSIS — F32.A ANXIETY AND DEPRESSION: ICD-10-CM

## 2023-07-31 DIAGNOSIS — Z12.4 ENCOUNTER FOR SCREENING FOR CERVICAL CANCER: ICD-10-CM

## 2023-07-31 DIAGNOSIS — Z30.431 INTRAUTERINE DEVICE SURVEILLANCE: ICD-10-CM

## 2023-07-31 DIAGNOSIS — Z01.419 ENCOUNTER FOR CERVICAL PAP SMEAR WITH PELVIC EXAM: Primary | ICD-10-CM

## 2023-07-31 DIAGNOSIS — F41.9 ANXIETY AND DEPRESSION: ICD-10-CM

## 2023-07-31 PROCEDURE — 99395 PREV VISIT EST AGE 18-39: CPT | Performed by: ADVANCED PRACTICE MIDWIFE

## 2023-07-31 PROCEDURE — 99214 OFFICE O/P EST MOD 30 MIN: CPT | Performed by: ADVANCED PRACTICE MIDWIFE

## 2023-07-31 RX ORDER — VENLAFAXINE HYDROCHLORIDE 37.5 MG/1
37.5 CAPSULE, EXTENDED RELEASE ORAL DAILY
Qty: 45 CAPSULE | Refills: 1 | Status: SHIPPED | OUTPATIENT
Start: 2023-07-31

## 2023-07-31 ASSESSMENT — PATIENT HEALTH QUESTIONNAIRE - PHQ9
1. LITTLE INTEREST OR PLEASURE IN DOING THINGS: NOT AT ALL
2. FEELING DOWN, DEPRESSED OR HOPELESS: 0
1. LITTLE INTEREST OR PLEASURE IN DOING THINGS: 0
SUM OF ALL RESPONSES TO PHQ9 QUESTIONS 1 & 2: 0
SUM OF ALL RESPONSES TO PHQ9 QUESTIONS 1 & 2: 0
SUM OF ALL RESPONSES TO PHQ QUESTIONS 1-9: 0
SUM OF ALL RESPONSES TO PHQ QUESTIONS 1-9: 0
2. FEELING DOWN, DEPRESSED OR HOPELESS: NOT AT ALL
SUM OF ALL RESPONSES TO PHQ QUESTIONS 1-9: 0
SUM OF ALL RESPONSES TO PHQ QUESTIONS 1-9: 0

## 2023-07-31 NOTE — PROGRESS NOTES
[facility]  CNM Office Note    Demetrius Quesada is a 25 y.o. female who presents today for her medical conditions/ complaints as noted below. Chief Complaint   Patient presents with    Annual Exam    Gynecologic Exam         HPI  Mike Miranda presents for annual gyn exam. She reports very stressful marriage issues and is /filed for divorce. She feels she has gained weight due to the increased stress. Not sexually active at this time. IUD in place and menses have become irregular recently. Reports anxiety and depression and would consider medical management. Last mammogram: never  Last pap smear: 2023 Negative   Sexually active: Yes  Sexual preference: Male  Contraception: IUD  : 3  Para: 2  AB: 1  Last bone density: never   Last colonoscopy: never  Menarche: age 13  LMP: unknown  Menses: Stressed out she will have periods-has had 3 this month  Problems/Complaints today:  1. Encounter for cervical Pap smear with pelvic exam  -     PAP SMEAR  2. Encounter for screening for cervical cancer  -     PAP SMEAR  -     Human papillomavirus (HPV) DNA Probe Thin Prep High Risk  3. Encounter for screening for human papillomavirus (HPV)  -     Human papillomavirus (HPV) DNA Probe Thin Prep High Risk  4. Intrauterine device surveillance       Patient Active Problem List   Diagnosis    IUD (intrauterine device) in place       No LMP recorded. (Menstrual status: Other - See Notes). 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 Social History     Tobacco Use    Smoking status: Never    Smokeless tobacco: Never   Substance Use Topics    Alcohol use: No       No current outpatient medications on file. No current facility-administered medications for this visit.      No Known Allergies  Vitals:    23

## 2023-07-31 NOTE — PROGRESS NOTES
Pt presents today for pap smear and breast exam.    She also complains of     Last mammogram: never  Last pap smear: 2023 Negative   Sexually active: Yes  Sexual preference: Male  Contraception: IUD  : 3  Para: 2  AB: 1  Last bone density: never   Last colonoscopy: never  Menarche: age 13  LMP: unknown  Menses: Stressed out she will have periods-has had 3 this month    Does want something for weight loss. Possibly something for stress. Under lots of stress with marriage.

## 2023-08-03 LAB
HPV HR 12 DNA SPEC QL NAA+PROBE: NOT DETECTED
HPV16 DNA SPEC QL NAA+PROBE: NOT DETECTED
HPV16+18+H RISK 12 DNA SPEC-IMP: NORMAL
HPV18 DNA SPEC QL NAA+PROBE: NOT DETECTED

## 2023-08-09 ENCOUNTER — HOSPITAL ENCOUNTER (OUTPATIENT)
Dept: ULTRASOUND IMAGING | Age: 28
Discharge: HOME | End: 2023-08-09
Attending: ADVANCED PRACTICE MIDWIFE
Payer: MEDICAID

## 2023-08-09 DIAGNOSIS — Z30.431 INTRAUTERINE DEVICE SURVEILLANCE: ICD-10-CM

## 2023-08-09 PROCEDURE — 76830 TRANSVAGINAL US NON-OB: CPT

## 2023-08-21 RX ORDER — FLUCONAZOLE 150 MG/1
150 TABLET ORAL
Qty: 2 TABLET | Refills: 0 | Status: SHIPPED | OUTPATIENT
Start: 2023-08-21 | End: 2023-08-24

## 2023-09-01 ENCOUNTER — OFFICE VISIT (OUTPATIENT)
Dept: INTERNAL MEDICINE | Facility: CLINIC | Age: 28
End: 2023-09-01
Payer: MEDICAID

## 2023-09-01 VITALS
HEART RATE: 84 BPM | TEMPERATURE: 97 F | DIASTOLIC BLOOD PRESSURE: 81 MMHG | RESPIRATION RATE: 17 BRPM | BODY MASS INDEX: 30.16 KG/M2 | OXYGEN SATURATION: 98 % | HEIGHT: 68 IN | WEIGHT: 199 LBS | SYSTOLIC BLOOD PRESSURE: 122 MMHG

## 2023-09-01 DIAGNOSIS — E53.8 VITAMIN B12 DEFICIENCY: ICD-10-CM

## 2023-09-01 DIAGNOSIS — Z11.59 ENCOUNTER FOR HEPATITIS C SCREENING TEST FOR LOW RISK PATIENT: Primary | ICD-10-CM

## 2023-09-01 DIAGNOSIS — R11.0 NAUSEA: ICD-10-CM

## 2023-09-01 DIAGNOSIS — R53.83 FATIGUE, UNSPECIFIED TYPE: ICD-10-CM

## 2023-09-01 DIAGNOSIS — E55.9 VITAMIN D DEFICIENCY: ICD-10-CM

## 2023-09-01 DIAGNOSIS — Z00.00 ROUTINE GENERAL MEDICAL EXAMINATION AT A HEALTH CARE FACILITY: ICD-10-CM

## 2023-09-01 DIAGNOSIS — G43.109 MIGRAINE WITH AURA AND WITHOUT STATUS MIGRAINOSUS, NOT INTRACTABLE: ICD-10-CM

## 2023-09-01 RX ORDER — ONDANSETRON 4 MG/1
4 TABLET, ORALLY DISINTEGRATING ORAL EVERY 8 HOURS PRN
Qty: 20 TABLET | Refills: 0 | Status: SHIPPED | OUTPATIENT
Start: 2023-09-01

## 2023-09-01 RX ORDER — FLUCONAZOLE 150 MG/1
TABLET ORAL
COMMUNITY
Start: 2023-08-21 | End: 2023-09-01

## 2023-09-01 RX ORDER — SUMATRIPTAN 25 MG/1
TABLET, FILM COATED ORAL
Qty: 20 TABLET | Refills: 0 | Status: SHIPPED | OUTPATIENT
Start: 2023-09-01

## 2023-09-01 RX ORDER — VENLAFAXINE HYDROCHLORIDE 37.5 MG/1
1 CAPSULE, EXTENDED RELEASE ORAL DAILY
COMMUNITY
Start: 2023-07-31 | End: 2023-09-01

## 2023-09-01 NOTE — PROGRESS NOTES
Subjective     Chief Complaint   Patient presents with    Headache     Establishing care. Constant since Sunday.        Headache    The patient presents for headaches.    The patient reports that she has been experiencing a significant amount of headaches that can last for weeks. She notes that she has had headache since Sunday, 08/27/2023. She states that she has not had chronic migraines. She notes that the headaches started just in the last 1.5 years, which could last every 2 to 3 days or every 4 to 5 days. In the last 2 years, she feels that the headaches are progressively longer in duration and the pain has been worsening. On Sunday (08/27/2023) and Tuesday night (08/29/2023), she reports that she was vomiting. She notes that she will take Tylenol the last minute since she does not prefer swallowing pills. She states that the Tylenol helps to a mild degree and lessens the severity of her nausea. She states that she does not notice the headaches during her menstrual cycle. She states that the headaches are triggered by stress. She does not notice if she has headaches during season change. She has also tried Aleve. She experiences vision issues with the headaches when the sun is bright. She reports that her  massaged her head, which lessened her headache. She denies being pregnant. She notes that she has not had laboratory tests recently. She reports that she is not seeing a primary care provider since 2018. She reports that she did not draw laboratory tests when she saw her gynecologist, BUFFY Schwartz CNM, last month (08/2023). She believes that her urine was checked then, as she was complaining of having urinary tract infections as well. She states that she drinks Sprite and does not drink any dark drinks. She also reports that she does have a Mirena. She states that she experiences the headaches in her temporal region. She notes that she has been having headaches more now than she did  "in the past. She reports that she will experience 6 or 7 headache episodes in 1 month, which are longer lasting most of the time. She notes that she works on the computer often. She reports that she was prescribed venlafaxine (Effexor), which she only took for 1.5 weeks as it significantly worsened her headaches. She does not believe that she has a family history of migraines. She reports that she can feel the migraines when they develop. She describes it as a pressure that is constantly present.    The patient denies having any chest pain, shortness of breath, or urinary problems.    The patient reports that she has a large varicose vein that can be painful at the end of the day.    Patient's PMR from outside medical facility reviewed and noted.    Review of Systems   Constitutional:  Negative for activity change, fatigue and unexpected weight change.   HENT:  Negative for congestion, ear pain, mouth sores, sinus pain, sore throat and trouble swallowing.    Eyes:  Positive for photophobia. Negative for discharge and visual disturbance.   Respiratory:  Negative for cough and shortness of breath.    Cardiovascular:  Negative for chest pain and leg swelling.   Gastrointestinal:  Positive for nausea and vomiting. Negative for abdominal pain, constipation and diarrhea.   Genitourinary:  Negative for decreased urine volume, difficulty urinating and hematuria.   Musculoskeletal:  Negative for back pain, gait problem and myalgias.   Skin:  Negative for color change and rash.        \"Varicose vein on left leg\"   Allergic/Immunologic: Negative for environmental allergies and immunocompromised state.   Neurological:  Positive for headaches. Negative for speech difficulty and weakness.   Psychiatric/Behavioral:  Negative for confusion and sleep disturbance. The patient is not nervous/anxious.       Otherwise complete ROS reviewed and negative except as mentioned in the HPI.    Past Medical History:   Past Medical History: "   Diagnosis Date    Anxiety     Rheumatoid arthritis      Past Surgical History:History reviewed. No pertinent surgical history.  Social History:  reports that she has never smoked. She has never used smokeless tobacco. She reports that she does not drink alcohol and does not use drugs.    Family History: family history includes Diabetes in her mother; Hypertension in her father; Other in her mother.      Allergies:  No Known Allergies  Medications:  Prior to Admission medications    Medication Sig Start Date End Date Taking? Authorizing Provider   fluconazole (DIFLUCAN) 150 MG tablet  8/21/23   Yoana Stephen MD   venlafaxine XR (EFFEXOR-XR) 37.5 MG 24 hr capsule Take 1 capsule by mouth Daily.  Patient not taking: Reported on 9/1/2023 7/31/23   ProviderYoana MD       LEONARDO:        PHQ-9 Depression Screening  Little interest or pleasure in doing things? 0-->not at all   Feeling down, depressed, or hopeless? 1-->several days   Trouble falling or staying asleep, or sleeping too much?     Feeling tired or having little energy?     Poor appetite or overeating?     Feeling bad about yourself - or that you are a failure or have let yourself or your family down?     Trouble concentrating on things, such as reading the newspaper or watching television?     Moving or speaking so slowly that other people could have noticed? Or the opposite - being so fidgety or restless that you have been moving around a lot more than usual?     Thoughts that you would be better off dead, or of hurting yourself in some way?     PHQ-9 Total Score 1   If you checked off any problems, how difficult have these problems made it for you to do your work, take care of things at home, or get along with other people?         PHQ-9 Total Score: 1   1-4 (Minimal Depression)  Support given, observe for worsening symptoms    Objective     Vital Signs: /81 (BP Location: Left arm, Patient Position: Sitting, Cuff Size: Adult)   Pulse 84    "Temp 97 øF (36.1 øC) (Skin)   Resp 17   Ht 172.1 cm (67.75\")   Wt 90.3 kg (199 lb)   SpO2 98%   BMI 30.48 kg/mý   Physical Exam  Vitals reviewed.   Constitutional:       General: She is not in acute distress.     Appearance: Normal appearance. She is not ill-appearing.   HENT:      Head: Normocephalic and atraumatic.      Right Ear: External ear normal.      Left Ear: External ear normal.      Nose: Nose normal.      Mouth/Throat:      Mouth: Mucous membranes are moist.      Pharynx: No posterior oropharyngeal erythema.   Eyes:      General: No scleral icterus.     Extraocular Movements: Extraocular movements intact.      Conjunctiva/sclera: Conjunctivae normal.      Pupils: Pupils are equal, round, and reactive to light.   Cardiovascular:      Rate and Rhythm: Normal rate and regular rhythm.      Pulses: Normal pulses.      Heart sounds: Normal heart sounds.   Pulmonary:      Effort: Pulmonary effort is normal. No respiratory distress.      Breath sounds: Normal breath sounds. No wheezing.   Abdominal:      General: Abdomen is flat. Bowel sounds are normal.      Palpations: Abdomen is soft.      Tenderness: There is no abdominal tenderness.   Musculoskeletal:         General: Normal range of motion.      Cervical back: Normal range of motion.      Right lower leg: No edema.      Left lower leg: No edema.   Skin:     General: Skin is warm and dry.      Findings: No erythema or rash.      Comments: Large varicose vein on left leg.    Neurological:      General: No focal deficit present.      Mental Status: She is alert and oriented to person, place, and time. Mental status is at baseline.      Motor: No weakness.   Psychiatric:         Mood and Affect: Mood normal.         Behavior: Behavior normal.         Thought Content: Thought content normal.         Judgment: Judgment normal.     BMI is >= 30 and <35. (Class 1 Obesity). The following options were offered after discussion;: weight loss educational material " (shared in after visit summary), exercise counseling/recommendations, and nutrition counseling/recommendations  BMI is >= 30 and <35. (Class 1 Obesity). The following options were offered after discussion;: exercise counseling/recommendations and nutrition counseling/recommendations        Results Reviewed:  Glucose   Date Value Ref Range Status   08/26/2021 81 74 - 109 mg/dL Final     BUN   Date Value Ref Range Status   08/26/2021 12 6 - 20 mg/dL Final     Creatinine   Date Value Ref Range Status   08/26/2021 0.8 0.5 - 0.9 mg/dL Final     Sodium   Date Value Ref Range Status   08/26/2021 139 136 - 145 mmol/L Final     Potassium   Date Value Ref Range Status   08/26/2021 3.7 3.5 - 5.0 mmol/L Final     Chloride   Date Value Ref Range Status   08/26/2021 106 98 - 111 mmol/L Final     CO2   Date Value Ref Range Status   08/26/2021 24 22 - 29 mmol/L Final     Calcium   Date Value Ref Range Status   08/26/2021 8.9 8.6 - 10.0 mg/dL Final     ALT (SGPT)   Date Value Ref Range Status   08/26/2021 9 5 - 33 U/L Final     AST (SGOT)   Date Value Ref Range Status   08/26/2021 12 5 - 32 U/L Final     WBC   Date Value Ref Range Status   08/26/2021 7.2 4.8 - 10.8 K/uL Final     Hematocrit   Date Value Ref Range Status   08/26/2021 38.6 37.0 - 47.0 % Final     Platelets   Date Value Ref Range Status   08/26/2021 217 130 - 400 K/uL Final         Assessment / Plan     Assessment/Plan:  1. Encounter for hepatitis C screening test for low risk patient  - Hepatitis C Antibody    2. Routine general medical examination at a health care facility  - CBC w AUTO Differential  - Comprehensive metabolic panel  - Lipid panel    3. Fatigue, unspecified type  - T4  - TSH    4. Vitamin B12 deficiency  - Vitamin B12    5. Vitamin D deficiency  - Vitamin D 25 hydroxy    6. Migraine with aura and without status migrainosus, not intractable  - CT Head Without Contrast; Future  - SUMAtriptan (Imitrex) 25 MG tablet; Take one tablet at onset of headache.  May repeat dose one time in 2 hours if headache not relieved.  Dispense: 20 tablet; Refill: 0  - ondansetron ODT (ZOFRAN-ODT) 4 MG disintegrating tablet; Place 1 tablet on the tongue Every 8 (Eight) Hours As Needed for Nausea or Vomiting.  Dispense: 20 tablet; Refill: 0    7. Nausea  - ondansetron ODT (ZOFRAN-ODT) 4 MG disintegrating tablet; Place 1 tablet on the tongue Every 8 (Eight) Hours As Needed for Nausea or Vomiting.  Dispense: 20 tablet; Refill: 0    Headaches  - The patient will be ordered a CT scan of the head.  - The patient will be ordered blood tests to look for any abnormal laboratory tests that can account for the headaches.  - The patient is advised to keep having her eyes checked every year.  - The patient will be prescribed sumatriptan (Imitrex) to be taken as needed at an onset of headache and can be repeated 2 hours after if no relief.  - The patient is advised that the sumatriptan (Imitrex) can cause dizziness and sleepiness, so she is advised against driving when she takes it.  - The patient will be prescribed ondansetron ODT (Zofran-ODT) to have on hand.  - The patient will be given a sample of rimegepant (Nurtec DOT).    Varicose vein  - The patient is advised the use of compression stockings to help with varicose veins.  - The patient is advised the possibility of being referred to Vascular Surgery for a vein stripping.    Health maintenance  - The patient will be checked her thyroid and vitamin levels as well.          Return in 1 month (on 10/1/2023) for Annual physical. unless patient needs to be seen sooner or acute issues arise.      I have discussed the patient results/orders and and plan/recommendation with them at today's visit.      BUFFY Barnes   09/01/2023      Transcribed from ambient dictation for BUFFY Barnes by Yoni Vinson.  09/01/23   14:33 CDT    Patient or patient representative verbalized consent to the visit recording.  I have personally performed  the services described in this document as transcribed by the above individual, and it is both accurate and complete.

## 2023-09-02 LAB
25(OH)D3+25(OH)D2 SERPL-MCNC: 26.3 NG/ML (ref 30–100)
ALBUMIN SERPL-MCNC: 4.3 G/DL (ref 4–5)
ALBUMIN/GLOB SERPL: 1.5 {RATIO} (ref 1.2–2.2)
ALP SERPL-CCNC: 58 IU/L (ref 44–121)
ALT SERPL-CCNC: 12 IU/L (ref 0–32)
AST SERPL-CCNC: 13 IU/L (ref 0–40)
BASOPHILS # BLD AUTO: 0.1 X10E3/UL (ref 0–0.2)
BASOPHILS NFR BLD AUTO: 1 %
BILIRUB SERPL-MCNC: 0.5 MG/DL (ref 0–1.2)
BUN SERPL-MCNC: 10 MG/DL (ref 6–20)
BUN/CREAT SERPL: 14 (ref 9–23)
CALCIUM SERPL-MCNC: 9.1 MG/DL (ref 8.7–10.2)
CHLORIDE SERPL-SCNC: 103 MMOL/L (ref 96–106)
CHOLEST SERPL-MCNC: 166 MG/DL (ref 100–199)
CO2 SERPL-SCNC: 23 MMOL/L (ref 20–29)
CREAT SERPL-MCNC: 0.71 MG/DL (ref 0.57–1)
EGFRCR SERPLBLD CKD-EPI 2021: 117 ML/MIN/1.73
EOSINOPHIL # BLD AUTO: 0.1 X10E3/UL (ref 0–0.4)
EOSINOPHIL NFR BLD AUTO: 2 %
ERYTHROCYTE [DISTWIDTH] IN BLOOD BY AUTOMATED COUNT: 12.5 % (ref 11.7–15.4)
GLOBULIN SER CALC-MCNC: 2.8 G/DL (ref 1.5–4.5)
GLUCOSE SERPL-MCNC: 75 MG/DL (ref 70–99)
HCT VFR BLD AUTO: 39.4 % (ref 34–46.6)
HCV IGG SERPL QL IA: NON REACTIVE
HDLC SERPL-MCNC: 71 MG/DL
HGB BLD-MCNC: 13.4 G/DL (ref 11.1–15.9)
IMM GRANULOCYTES # BLD AUTO: 0 X10E3/UL (ref 0–0.1)
IMM GRANULOCYTES NFR BLD AUTO: 0 %
LDLC SERPL CALC-MCNC: 86 MG/DL (ref 0–99)
LYMPHOCYTES # BLD AUTO: 1.7 X10E3/UL (ref 0.7–3.1)
LYMPHOCYTES NFR BLD AUTO: 37 %
MCH RBC QN AUTO: 31.6 PG (ref 26.6–33)
MCHC RBC AUTO-ENTMCNC: 34 G/DL (ref 31.5–35.7)
MCV RBC AUTO: 93 FL (ref 79–97)
MONOCYTES # BLD AUTO: 0.4 X10E3/UL (ref 0.1–0.9)
MONOCYTES NFR BLD AUTO: 9 %
NEUTROPHILS # BLD AUTO: 2.4 X10E3/UL (ref 1.4–7)
NEUTROPHILS NFR BLD AUTO: 51 %
PLATELET # BLD AUTO: 269 X10E3/UL (ref 150–450)
POTASSIUM SERPL-SCNC: 4.2 MMOL/L (ref 3.5–5.2)
PROT SERPL-MCNC: 7.1 G/DL (ref 6–8.5)
RBC # BLD AUTO: 4.24 X10E6/UL (ref 3.77–5.28)
SODIUM SERPL-SCNC: 139 MMOL/L (ref 134–144)
T4 SERPL-MCNC: 8.1 UG/DL (ref 4.5–12)
TRIGL SERPL-MCNC: 43 MG/DL (ref 0–149)
TSH SERPL DL<=0.005 MIU/L-ACNC: 1.42 UIU/ML (ref 0.45–4.5)
VIT B12 SERPL-MCNC: 646 PG/ML (ref 232–1245)
VLDLC SERPL CALC-MCNC: 9 MG/DL (ref 5–40)
WBC # BLD AUTO: 4.6 X10E3/UL (ref 3.4–10.8)

## 2023-09-15 ENCOUNTER — HOSPITAL ENCOUNTER (OUTPATIENT)
Dept: CT IMAGING | Facility: HOSPITAL | Age: 28
Discharge: HOME | End: 2023-09-15
Payer: MEDICAID

## 2023-09-15 DIAGNOSIS — G43.109 MIGRAINE WITH AURA AND WITHOUT STATUS MIGRAINOSUS, NOT INTRACTABLE: ICD-10-CM

## 2023-09-15 PROCEDURE — 70450 CT HEAD/BRAIN W/O DYE: CPT

## 2024-01-18 ENCOUNTER — OFFICE VISIT (OUTPATIENT)
Dept: INTERNAL MEDICINE | Facility: CLINIC | Age: 29
End: 2024-01-18
Payer: MEDICAID

## 2024-01-18 VITALS
HEIGHT: 68 IN | HEART RATE: 76 BPM | SYSTOLIC BLOOD PRESSURE: 120 MMHG | TEMPERATURE: 98.4 F | RESPIRATION RATE: 17 BRPM | WEIGHT: 200.4 LBS | DIASTOLIC BLOOD PRESSURE: 79 MMHG | BODY MASS INDEX: 30.37 KG/M2 | OXYGEN SATURATION: 98 %

## 2024-01-18 DIAGNOSIS — J01.10 ACUTE NON-RECURRENT FRONTAL SINUSITIS: Primary | ICD-10-CM

## 2024-01-18 PROCEDURE — 1160F RVW MEDS BY RX/DR IN RCRD: CPT

## 2024-01-18 PROCEDURE — 1159F MED LIST DOCD IN RCRD: CPT

## 2024-01-18 PROCEDURE — 99213 OFFICE O/P EST LOW 20 MIN: CPT

## 2024-01-18 RX ORDER — AMOXICILLIN AND CLAVULANATE POTASSIUM 875; 125 MG/1; MG/1
1 TABLET, FILM COATED ORAL 2 TIMES DAILY
Qty: 14 TABLET | Refills: 0 | Status: SHIPPED | OUTPATIENT
Start: 2024-01-18 | End: 2024-01-25

## 2024-01-18 RX ORDER — METHYLPREDNISOLONE 4 MG/1
TABLET ORAL
Qty: 21 TABLET | Refills: 0 | Status: SHIPPED | OUTPATIENT
Start: 2024-01-18

## 2024-01-18 NOTE — PROGRESS NOTES
"        Subjective     Chief Complaint   Patient presents with    Nasal Congestion    Earache     Left side.        Earache   Associated symptoms include coughing and rhinorrhea. Pertinent negatives include no abdominal pain, diarrhea, headaches, rash or sore throat.   Patient presents today with complaint of nasal congestion and ear discomfort.  States that she feels like she is starting to get head cold and is leaving for vacation next week and wants to get ahead of it.  She denies any fevers.  Ports she has had a mild cough that is nonproductive and does feel fullness in her face.  Has not been around anyone that has had a known illness.  Feels that her throat and neck feel \"tight\" denies any difficulty swallowing.  No shortness of breath.    Patient's PMR from outside medical facility reviewed and noted.    Review of Systems   Constitutional:  Negative for activity change, fatigue and unexpected weight change.   HENT:  Positive for congestion, ear pain (left ear), postnasal drip and rhinorrhea. Negative for mouth sores, sore throat and trouble swallowing.    Eyes:  Negative for discharge and visual disturbance.   Respiratory:  Positive for cough. Negative for shortness of breath.    Cardiovascular:  Negative for chest pain and leg swelling.   Gastrointestinal:  Negative for abdominal pain, constipation, diarrhea and nausea.   Genitourinary:  Negative for decreased urine volume, difficulty urinating and hematuria.   Musculoskeletal:  Negative for back pain and gait problem.   Skin:  Negative for color change and rash.   Allergic/Immunologic: Negative for environmental allergies and immunocompromised state.   Neurological:  Negative for weakness and headaches.   Psychiatric/Behavioral:  Negative for confusion and sleep disturbance.         Otherwise complete ROS reviewed and negative except as mentioned in the HPI.    Past Medical History:   Past Medical History:   Diagnosis Date    Anxiety     Rheumatoid " "arthritis      Past Surgical History:History reviewed. No pertinent surgical history.  Social History:  reports that she has never smoked. She has never used smokeless tobacco. She reports that she does not drink alcohol and does not use drugs.    Family History: family history includes Diabetes in her mother; Hypertension in her father; Other in her mother.      Allergies:  No Known Allergies  Medications:  Prior to Admission medications    Medication Sig Start Date End Date Taking? Authorizing Provider   ondansetron ODT (ZOFRAN-ODT) 4 MG disintegrating tablet Place 1 tablet on the tongue Every 8 (Eight) Hours As Needed for Nausea or Vomiting. 9/1/23  Yes Marlene Briseno APRN   SUMAtriptan (Imitrex) 25 MG tablet Take one tablet at onset of headache. May repeat dose one time in 2 hours if headache not relieved. 9/1/23  Yes Marlene Briseno APRN       Objective     Vital Signs: /79 (BP Location: Left arm, Patient Position: Sitting, Cuff Size: Adult)   Pulse 76   Temp 98.4 °F (36.9 °C) (Skin)   Resp 17   Ht 172.1 cm (67.75\")   Wt 90.9 kg (200 lb 6.4 oz)   SpO2 98%   BMI 30.70 kg/m²   Physical Exam  Vitals and nursing note reviewed.   Constitutional:       General: She is not in acute distress.     Appearance: Normal appearance. She is not ill-appearing.   HENT:      Head: Normocephalic and atraumatic.      Right Ear: External ear normal. There is no impacted cerumen.      Left Ear: External ear normal. There is no impacted cerumen.      Nose: Nose normal. Congestion and rhinorrhea present.      Mouth/Throat:      Mouth: Mucous membranes are moist.      Pharynx: Posterior oropharyngeal erythema present.   Eyes:      Extraocular Movements: Extraocular movements intact.      Conjunctiva/sclera: Conjunctivae normal.      Pupils: Pupils are equal, round, and reactive to light.   Cardiovascular:      Rate and Rhythm: Normal rate and regular rhythm.      Pulses: Normal pulses.      Heart sounds: Normal heart " sounds.   Pulmonary:      Effort: Pulmonary effort is normal. No respiratory distress.      Breath sounds: Normal breath sounds. No stridor. No wheezing or rhonchi.   Abdominal:      General: Abdomen is flat. Bowel sounds are normal.      Palpations: Abdomen is soft.      Tenderness: There is no abdominal tenderness.   Musculoskeletal:         General: Normal range of motion.      Cervical back: Normal range of motion.      Right lower leg: No edema.      Left lower leg: No edema.   Skin:     General: Skin is warm and dry.      Findings: No erythema or rash.   Neurological:      General: No focal deficit present.      Mental Status: She is alert and oriented to person, place, and time. Mental status is at baseline.      Motor: No weakness.   Psychiatric:         Mood and Affect: Mood normal.         Behavior: Behavior normal.         Thought Content: Thought content normal.         Judgment: Judgment normal.           Results Reviewed:  Glucose   Date Value Ref Range Status   09/01/2023 75 70 - 99 mg/dL Final   08/26/2021 81 74 - 109 mg/dL Final     BUN   Date Value Ref Range Status   09/01/2023 10 6 - 20 mg/dL Final   08/26/2021 12 6 - 20 mg/dL Final     Creatinine   Date Value Ref Range Status   09/01/2023 0.71 0.57 - 1.00 mg/dL Final   08/26/2021 0.8 0.5 - 0.9 mg/dL Final     Sodium   Date Value Ref Range Status   09/01/2023 139 134 - 144 mmol/L Final   08/26/2021 139 136 - 145 mmol/L Final     Potassium   Date Value Ref Range Status   09/01/2023 4.2 3.5 - 5.2 mmol/L Final   08/26/2021 3.7 3.5 - 5.0 mmol/L Final     Chloride   Date Value Ref Range Status   09/01/2023 103 96 - 106 mmol/L Final   08/26/2021 106 98 - 111 mmol/L Final     CO2   Date Value Ref Range Status   08/26/2021 24 22 - 29 mmol/L Final     Total CO2   Date Value Ref Range Status   09/01/2023 23 20 - 29 mmol/L Final     Calcium   Date Value Ref Range Status   09/01/2023 9.1 8.7 - 10.2 mg/dL Final   08/26/2021 8.9 8.6 - 10.0 mg/dL Final     ALT  (SGPT)   Date Value Ref Range Status   09/01/2023 12 0 - 32 IU/L Final   08/26/2021 9 5 - 33 U/L Final     AST (SGOT)   Date Value Ref Range Status   09/01/2023 13 0 - 40 IU/L Final   08/26/2021 12 5 - 32 U/L Final     WBC   Date Value Ref Range Status   09/01/2023 4.6 3.4 - 10.8 x10E3/uL Final   08/26/2021 7.2 4.8 - 10.8 K/uL Final     Hematocrit   Date Value Ref Range Status   09/01/2023 39.4 34.0 - 46.6 % Final   08/26/2021 38.6 37.0 - 47.0 % Final     Platelets   Date Value Ref Range Status   09/01/2023 269 150 - 450 x10E3/uL Final   08/26/2021 217 130 - 400 K/uL Final     Triglycerides   Date Value Ref Range Status   09/01/2023 43 0 - 149 mg/dL Final     HDL Cholesterol   Date Value Ref Range Status   09/01/2023 71 >39 mg/dL Final     LDL Chol Calc (NIH)   Date Value Ref Range Status   09/01/2023 86 0 - 99 mg/dL Final         Assessment / Plan     Assessment/Plan:  1. Acute non-recurrent frontal sinusitis  - methylPREDNISolone (MEDROL) 4 MG dose pack; Take as directed on package instructions.  Dispense: 21 tablet; Refill: 0  - amoxicillin-clavulanate (AUGMENTIN) 875-125 MG per tablet; Take 1 tablet by mouth 2 (Two) Times a Day for 7 days.  Dispense: 14 tablet; Refill: 0      Return if symptoms worsen or fail to improve. unless patient needs to be seen sooner or acute issues arise.      I have discussed the patient results/orders and and plan/recommendation with them at today's visit.      Signed by:    BUFFY Barnes Date: 01/18/2024

## 2024-06-11 ENCOUNTER — OFFICE VISIT (OUTPATIENT)
Dept: INTERNAL MEDICINE | Facility: CLINIC | Age: 29
End: 2024-06-11
Payer: MEDICAID

## 2024-06-11 VITALS
HEART RATE: 90 BPM | BODY MASS INDEX: 30.11 KG/M2 | HEIGHT: 68 IN | TEMPERATURE: 99.1 F | SYSTOLIC BLOOD PRESSURE: 110 MMHG | RESPIRATION RATE: 16 BRPM | OXYGEN SATURATION: 99 % | DIASTOLIC BLOOD PRESSURE: 78 MMHG | WEIGHT: 198.7 LBS

## 2024-06-11 DIAGNOSIS — R09.89 CHRONIC THROAT CLEARING: ICD-10-CM

## 2024-06-11 DIAGNOSIS — G43.119 INTRACTABLE MIGRAINE WITH AURA WITHOUT STATUS MIGRAINOSUS: Primary | ICD-10-CM

## 2024-06-11 PROCEDURE — 99213 OFFICE O/P EST LOW 20 MIN: CPT

## 2024-06-11 PROCEDURE — 1160F RVW MEDS BY RX/DR IN RCRD: CPT

## 2024-06-11 PROCEDURE — 1159F MED LIST DOCD IN RCRD: CPT

## 2024-06-11 RX ORDER — LEVOCETIRIZINE DIHYDROCHLORIDE 5 MG/1
5 TABLET, FILM COATED ORAL EVERY EVENING
Qty: 30 TABLET | Refills: 0 | Status: SHIPPED | OUTPATIENT
Start: 2024-06-11

## 2024-06-11 RX ORDER — RIMEGEPANT SULFATE 75 MG/75MG
75 TABLET, ORALLY DISINTEGRATING ORAL DAILY PRN
Qty: 4 TABLET | Refills: 0 | COMMUNITY
Start: 2024-06-11

## 2024-06-11 NOTE — PROGRESS NOTES
"        Subjective     Chief Complaint   Patient presents with    Sore Throat     Constantly clearing throat.     Headache       Sore Throat   Associated symptoms include headaches.   Headache    History of Present Illness  The patient presents for evaluation of multiple medical concerns.    The patient reports experiencing severe headaches, which she attributes to stress. She describes a sensation of something lodged in her throat, akin to a bubble, which she has been constantly clearing for several years. This condition has progressively worsened, particularly when consuming milk, ice cream, and thick foods such as cereal or bread. Despite increasing her water intake, the sensation is less severe. She does not consume sodas and does not believe it is related to acid reflux. She has attempted gargling with warm salt water, but to no avail. She finds relief by drinking a lot of water, despite her tendency to remain dehydrated. Despite trying lactose-free milk, her symptoms persist. She denies difficulty swallowing, but describes a sensation akin to a bubble in her throat. Yesterday, she experienced a pounding sensation in her head and a strange sensation in the entire left side of her body. Since she woke up yesterday, her throat symptoms have worsened.    Patient's PMR from outside medical facility reviewed and noted.    Review of Systems   HENT:  Positive for sore throat.         \"Clearing throat\"   Neurological:  Positive for headaches.        Otherwise complete ROS reviewed and negative except as mentioned in the HPI.    Past Medical History:   Past Medical History:   Diagnosis Date    Anxiety     Rheumatoid arthritis      Past Surgical History:History reviewed. No pertinent surgical history.  Social History:  reports that she has never smoked. She has never used smokeless tobacco. She reports that she does not drink alcohol and does not use drugs.    Family History: family history includes Diabetes in her " mother; Hypertension in her father; Other in her mother.      Allergies:  No Known Allergies  Medications:  Prior to Admission medications    Medication Sig Start Date End Date Taking? Authorizing Provider   ondansetron ODT (ZOFRAN-ODT) 4 MG disintegrating tablet Place 1 tablet on the tongue Every 8 (Eight) Hours As Needed for Nausea or Vomiting. 9/1/23  Yes Marlene Briseno APRN   methylPREDNISolone (MEDROL) 4 MG dose pack Take as directed on package instructions.  Patient not taking: Reported on 6/11/2024 1/18/24   Marlene Briseno APRN   SUMAtriptan (Imitrex) 25 MG tablet Take one tablet at onset of headache. May repeat dose one time in 2 hours if headache not relieved.  Patient not taking: Reported on 6/11/2024 9/1/23   Marlene Briseno APRN       LEONARDO:        PHQ-9 Depression Screening  Little interest or pleasure in doing things? 0-->not at all   Feeling down, depressed, or hopeless? 0-->not at all   Trouble falling or staying asleep, or sleeping too much?     Feeling tired or having little energy?     Poor appetite or overeating?     Feeling bad about yourself - or that you are a failure or have let yourself or your family down?     Trouble concentrating on things, such as reading the newspaper or watching television?     Moving or speaking so slowly that other people could have noticed? Or the opposite - being so fidgety or restless that you have been moving around a lot more than usual?     Thoughts that you would be better off dead, or of hurting yourself in some way?     PHQ-9 Total Score 0   If you checked off any problems, how difficult have these problems made it for you to do your work, take care of things at home, or get along with other people?         PHQ-9 Total Score: 0   0 (Negative screening for depression)  Support given, observe for worsening symptoms    Objective     Vital Signs: /78 (BP Location: Right arm, Patient Position: Sitting, Cuff Size: Adult)   Pulse 90   Temp 99.1 °F  "(37.3 °C) (Skin)   Resp 16   Ht 172.1 cm (67.75\")   Wt 90.1 kg (198 lb 11.2 oz)   SpO2 99%   BMI 30.44 kg/m²   Physical Exam           Advance Care Planning   ACP discussion was held with the patient during this visit.         Results Reviewed:  Glucose   Date Value Ref Range Status   09/01/2023 75 70 - 99 mg/dL Final   08/26/2021 81 74 - 109 mg/dL Final     BUN   Date Value Ref Range Status   09/01/2023 10 6 - 20 mg/dL Final   08/26/2021 12 6 - 20 mg/dL Final     Creatinine   Date Value Ref Range Status   09/01/2023 0.71 0.57 - 1.00 mg/dL Final   08/26/2021 0.8 0.5 - 0.9 mg/dL Final     Sodium   Date Value Ref Range Status   09/01/2023 139 134 - 144 mmol/L Final   08/26/2021 139 136 - 145 mmol/L Final     Potassium   Date Value Ref Range Status   09/01/2023 4.2 3.5 - 5.2 mmol/L Final   08/26/2021 3.7 3.5 - 5.0 mmol/L Final     Chloride   Date Value Ref Range Status   09/01/2023 103 96 - 106 mmol/L Final   08/26/2021 106 98 - 111 mmol/L Final     CO2   Date Value Ref Range Status   08/26/2021 24 22 - 29 mmol/L Final     Total CO2   Date Value Ref Range Status   09/01/2023 23 20 - 29 mmol/L Final     Calcium   Date Value Ref Range Status   09/01/2023 9.1 8.7 - 10.2 mg/dL Final   08/26/2021 8.9 8.6 - 10.0 mg/dL Final     ALT (SGPT)   Date Value Ref Range Status   09/01/2023 12 0 - 32 IU/L Final   08/26/2021 9 5 - 33 U/L Final     AST (SGOT)   Date Value Ref Range Status   09/01/2023 13 0 - 40 IU/L Final   08/26/2021 12 5 - 32 U/L Final     WBC   Date Value Ref Range Status   09/01/2023 4.6 3.4 - 10.8 x10E3/uL Final   08/26/2021 7.2 4.8 - 10.8 K/uL Final     Hematocrit   Date Value Ref Range Status   09/01/2023 39.4 34.0 - 46.6 % Final   08/26/2021 38.6 37.0 - 47.0 % Final     Platelets   Date Value Ref Range Status   09/01/2023 269 150 - 450 x10E3/uL Final   08/26/2021 217 130 - 400 K/uL Final     Triglycerides   Date Value Ref Range Status   09/01/2023 43 0 - 149 mg/dL Final     HDL Cholesterol   Date Value Ref " Range Status   09/01/2023 71 >39 mg/dL Final     LDL Chol Calc ([facility])   Date Value Ref Range Status   09/01/2023 86 0 - 99 mg/dL Final         Assessment / Plan     Assessment/Plan:    1. Intractable migraine with aura without status migrainosus  - Rimegepant Sulfate (Nurtec) 75 MG tablet dispersible tablet; Take 1 tablet by mouth Daily As Needed (migraines).  Dispense: 4 tablet; Refill: 0    2. Chronic throat clearing  - levocetirizine (XYZAL) 5 MG tablet; Take 1 tablet by mouth Every Evening.  Dispense: 30 tablet; Refill: 0    Diagnoses and all orders for this visit:    1. Intractable migraine with aura without status migrainosus (Primary)  -     Rimegepant Sulfate (Nurtec) 75 MG tablet dispersible tablet; Take 1 tablet by mouth Daily As Needed (migraines).  Dispense: 4 tablet; Refill: 0    2. Chronic throat clearing  -     levocetirizine (XYZAL) 5 MG tablet; Take 1 tablet by mouth Every Evening.  Dispense: 30 tablet; Refill: 0        Assessment & Plan  1. Headaches.  Samples of Nurtec were provided today.    2. Throat discomfort.  A food allergy panel will be conducted at follow up appointment if no improvement. The patient was advised to maintain a food diary to identify any exacerbation and alleviating factors. Xyzal was prescribed, to be taken once daily. Should the food allergy panel yield normal results, a swallow study or an EGD may be considered.    No follow-ups on file. unless patient needs to be seen sooner or acute issues arise.      I have discussed the patient results/orders and and plan/recommendation with them at today's visit.    Patient or patient representative verbalized consent for the use of Ambient Listening during the visit with  BUFFY Barnes for chart documentation. 6/14/2024  13:49 CDT  BUFFY Barnes   06/11/2024

## 2025-01-06 ENCOUNTER — OFFICE VISIT (OUTPATIENT)
Age: 30
End: 2025-01-06
Payer: COMMERCIAL

## 2025-01-06 VITALS
OXYGEN SATURATION: 97 % | TEMPERATURE: 97.7 F | SYSTOLIC BLOOD PRESSURE: 108 MMHG | HEART RATE: 102 BPM | DIASTOLIC BLOOD PRESSURE: 64 MMHG | RESPIRATION RATE: 20 BRPM | BODY MASS INDEX: 29.94 KG/M2 | WEIGHT: 194 LBS

## 2025-01-06 DIAGNOSIS — H66.91 ACUTE OTITIS MEDIA, RIGHT: ICD-10-CM

## 2025-01-06 DIAGNOSIS — R09.81 SINUS CONGESTION: ICD-10-CM

## 2025-01-06 DIAGNOSIS — J02.9 SORE THROAT: ICD-10-CM

## 2025-01-06 DIAGNOSIS — R52 BODY ACHES: ICD-10-CM

## 2025-01-06 DIAGNOSIS — J10.1 INFLUENZA DUE TO INFLUENZA VIRUS, TYPE A, HUMAN: Primary | ICD-10-CM

## 2025-01-06 DIAGNOSIS — R05.1 ACUTE COUGH: ICD-10-CM

## 2025-01-06 LAB
INFLUENZA A ANTIBODY: ABNORMAL
INFLUENZA B ANTIBODY: NEGATIVE
Lab: NORMAL
QC PASS/FAIL: NORMAL
S PYO AG THROAT QL: NORMAL
SARS-COV-2, POC: NORMAL

## 2025-01-06 PROCEDURE — 87880 STREP A ASSAY W/OPTIC: CPT

## 2025-01-06 PROCEDURE — 87811 SARS-COV-2 COVID19 W/OPTIC: CPT

## 2025-01-06 PROCEDURE — 87804 INFLUENZA ASSAY W/OPTIC: CPT

## 2025-01-06 PROCEDURE — 99213 OFFICE O/P EST LOW 20 MIN: CPT

## 2025-01-06 RX ORDER — CEFDINIR 300 MG/1
300 CAPSULE ORAL 2 TIMES DAILY
Qty: 14 CAPSULE | Refills: 0 | Status: SHIPPED | OUTPATIENT
Start: 2025-01-06 | End: 2025-01-13

## 2025-01-06 RX ORDER — BROMPHENIRAMINE MALEATE, PSEUDOEPHEDRINE HYDROCHLORIDE, AND DEXTROMETHORPHAN HYDROBROMIDE 2; 30; 10 MG/5ML; MG/5ML; MG/5ML
5-10 SYRUP ORAL 4 TIMES DAILY PRN
Qty: 200 ML | Refills: 0 | Status: SHIPPED | OUTPATIENT
Start: 2025-01-06 | End: 2025-01-11

## 2025-01-06 ASSESSMENT — ENCOUNTER SYMPTOMS
CONSTIPATION: 0
COLOR CHANGE: 0
EYE PAIN: 0
EYE DISCHARGE: 0
NAUSEA: 0
SINUS PRESSURE: 1
EYE REDNESS: 0
ABDOMINAL PAIN: 0
ABDOMINAL DISTENTION: 0
WHEEZING: 0
SORE THROAT: 1
CHOKING: 0
COUGH: 1
FACIAL SWELLING: 0
APNEA: 0
CHEST TIGHTNESS: 0
SINUS PAIN: 0
DIARRHEA: 0
VOMITING: 0
TROUBLE SWALLOWING: 0
SHORTNESS OF BREATH: 0
STRIDOR: 0

## 2025-01-06 NOTE — PROGRESS NOTES
[facility]  [facility]  [address] [place] [id number]  Dept: [phone]  Dept Fax: [phone]  Loc: [phone]    Amy Lassiter is a 29 y.o. female who presents today for her medical conditions/complaints as noted below.  Amy Lassiter is complaining of Congestion (Since Friday ), Cough, Fever, and Lower Back Pain        HPI:   Amy Lassiter presents to the clinic today with complaints of sinus congestion, sinus drainage, cough, earache, sore throat, fever, and bodyaches.  Symptoms started on Saturday.  Denies fever, chills.  Admits exposure to sick contacts with similar symptoms.  No alleviating factors are reported for this.  Symptoms are moderate.  Patient stable.        Past Medical History:   Diagnosis Date    Rheu arthritis of unsp knee w involv of organs and systems (HCC) 2015       Past Surgical History:   Procedure Laterality Date    WISDOM TOOTH EXTRACTION  2013       History reviewed. No pertinent family history.    Social History     Tobacco Use    Smoking status: Never    Smokeless tobacco: Never   Substance Use Topics    Alcohol use: No        Current Outpatient Medications   Medication Sig Dispense Refill    brompheniramine-pseudoephedrine-DM 2-30-10 MG/5ML syrup Take 5-10 mLs by mouth 4 times daily as needed for Cough 200 mL 0    cefdinir (OMNICEF) 300 MG capsule Take 1 capsule by mouth 2 times daily for 7 days 14 capsule 0    venlafaxine (EFFEXOR XR) 37.5 MG extended release capsule Take 1 capsule by mouth daily (Patient not taking: Reported on 1/6/2025) 45 capsule 1     No current facility-administered medications for this visit.       No Known Allergies    Health Maintenance   Topic Date Due    Varicella vaccine (1 of 2 - 13+ 2-dose series) Never done    Hepatitis C screen  Never done    Hepatitis B vaccine (1 of 3 - 19+ 3-dose series) Never done    DTaP/Tdap/Td vaccine (1 - Tdap) Never done    Depression Monitoring

## 2025-01-06 NOTE — PATIENT INSTRUCTIONS
Flu test was positive for type A     Strep and flu tests are negative    Cefdinir prescribed for treatment of ear infection.  Take full course of antibiotics.    Bromfed prescribed as needed for cough.    Recommended supportive care:  - Increase fluid intake  - Encouraged adequate rest  - Recommended OTC claritin or zyrtec and flonase  - Take OTC motrin/tylenol for fevers/body aches    Stay home until at least 24 hours fever free without medications.     Monitor for signs of dehydration: decreased urine output, dark urine, feeling weak or dizzy, and go to the ER if these occur.     The patient is to follow up with PCP or return to clinic if symptoms worsen/fail to improve.    Any condition can change, despite proper treatment. Therefore, if symptoms still persist or worsen after treatment plan intitated today, either go to the nearest ER, or call PCP, or return to UC for further evaluation.    Urgent Care evaluation today is not a substitute for PCP visit. Follow up care is your responsibility to discuss and review this UC visit.

## 2025-01-09 DIAGNOSIS — N91.2 AMENORRHEA: ICD-10-CM

## 2025-01-09 DIAGNOSIS — Z32.00 POSSIBLE PREGNANCY: Primary | ICD-10-CM

## 2025-01-28 ENCOUNTER — OFFICE VISIT (OUTPATIENT)
Dept: INTERNAL MEDICINE | Facility: CLINIC | Age: 30
End: 2025-01-28
Payer: COMMERCIAL

## 2025-01-28 VITALS
HEART RATE: 100 BPM | SYSTOLIC BLOOD PRESSURE: 122 MMHG | HEIGHT: 68 IN | DIASTOLIC BLOOD PRESSURE: 78 MMHG | BODY MASS INDEX: 29.16 KG/M2 | OXYGEN SATURATION: 100 % | WEIGHT: 192.4 LBS | RESPIRATION RATE: 18 BRPM

## 2025-01-28 DIAGNOSIS — R53.82 CHRONIC FATIGUE: ICD-10-CM

## 2025-01-28 DIAGNOSIS — R09.89 CHRONIC THROAT CLEARING: Primary | ICD-10-CM

## 2025-01-28 DIAGNOSIS — R51.9 FREQUENT HEADACHES: ICD-10-CM

## 2025-01-28 PROCEDURE — 99214 OFFICE O/P EST MOD 30 MIN: CPT

## 2025-01-28 RX ORDER — PANTOPRAZOLE SODIUM 40 MG/1
40 TABLET, DELAYED RELEASE ORAL DAILY
Qty: 30 TABLET | Refills: 0 | Status: SHIPPED | OUTPATIENT
Start: 2025-01-28

## 2025-01-28 NOTE — PROGRESS NOTES
Subjective     Chief Complaint   Patient presents with    Headache    Difficulty Swallowing     Constantly clearing throat.        History of Present Illness  History of Present Illness  The patient presents for evaluation of a persistent throat issue and headaches.    She reports a sensation of her throat either constricting or having a foreign body lodged within it, despite the absence of any soreness. She has observed a red spot in her throat during oral hygiene practices. She experiences constant thirst and feels dehydrated even with excessive water intake. She had influenza infection for 2.5 weeks after [place], which was a challenging period. Since then, she has been experiencing persistent throat symptoms, including frequent coughing and throat clearing. She recalls an episode of dyspnea at night, which was alleviated by sitting upright, consuming water and a banana. She reports no significant nasal drainage. She suspects a possible association between her symptoms and dairy consumption, as they seem to exacerbate following intake of dairy products. She also reports severe headaches lasting 2 to 3 days, which are unresponsive to Tylenol and ibuprofen. She has tried Nurtec, which provided temporary relief, but the headaches recurred after 3 to 4 days. She has also tried essential oils, which provided some relief. She does not believe her headaches are related to her throat symptoms. She has sought ophthalmological evaluation for her headaches, but no changes were noted. She describes a sensation of swelling in her throat, both externally and internally. She has attempted self-treatment with over-the-counter allergy medications without success. She has also tried Delsym, which provided temporary relief from her cough. She has been using 800 mg of ibuprofen for her headaches, but it has not been effective.    MEDICATIONS  Current: Tylenol, ibuprofen, Delsym, Protonix, Nurtec    Patient's PMR from  outside medical facility reviewed and noted.    Review of Systems     Otherwise complete ROS reviewed and negative except as mentioned in the HPI.    Past Medical History:   Past Medical History:   Diagnosis Date    Anxiety     Rheumatoid arthritis      Past Surgical History:History reviewed. No pertinent surgical history.  Social History:  reports that she has never smoked. She has never used smokeless tobacco. She reports that she does not drink alcohol and does not use drugs.    Family History: family history includes Diabetes in her mother; Hypertension in her father; Other in her mother.      Allergies:  No Known Allergies  Medications:  Prior to Admission medications    Medication Sig Start Date End Date Taking? Authorizing Provider   levocetirizine (XYZAL) 5 MG tablet Take 1 tablet by mouth Every Evening. 6/11/24  Yes Marlene Briseno APRN   ondansetron ODT (ZOFRAN-ODT) 4 MG disintegrating tablet Place 1 tablet on the tongue Every 8 (Eight) Hours As Needed for Nausea or Vomiting. 9/1/23  Yes Marlene Briseno APRN   Rimegepant Sulfate (Nurtec) 75 MG tablet dispersible tablet Take 1 tablet by mouth Daily As Needed (migraines). 6/11/24  Yes Marlene Briseno APRN       LEONARDO:        PHQ-9 Depression Screening  Little interest or pleasure in doing things? Not at all   Feeling down, depressed, or hopeless? Not at all   PHQ-2 Total Score 0   Trouble falling or staying asleep, or sleeping too much?     Feeling tired or having little energy?     Poor appetite or overeating?     Feeling bad about yourself - or that you are a failure or have let yourself or your family down?     Trouble concentrating on things, such as reading the newspaper or watching television?     Moving or speaking so slowly that other people could have noticed? Or the opposite - being so fidgety or restless that you have been moving around a lot more than usual?     Thoughts that you would be better off dead, or of hurting yourself in some way?    "  PHQ-9 Total Score     If you checked off any problems, how difficult have these problems made it for you to do your work, take care of things at home, or get along with other people? Not difficult at all       PHQ-9 Total Score:     0 (Negative screening for depression)  Support given, observe for worsening symptoms    Objective     Vital Signs: /78 (BP Location: Right arm, Patient Position: Sitting, Cuff Size: Adult)   Pulse 100   Resp 18   Ht 172.1 cm (67.75\")   Wt 87.3 kg (192 lb 6.4 oz)   SpO2 100%   BMI 29.47 kg/m²   Physical Exam  Vitals and nursing note reviewed.   Constitutional:       General: She is not in acute distress.     Appearance: Normal appearance. She is not ill-appearing.   HENT:      Head: Normocephalic and atraumatic.      Nose: Nose normal.      Mouth/Throat:      Mouth: Mucous membranes are moist.      Pharynx: Posterior oropharyngeal erythema present. No oropharyngeal exudate.   Eyes:      General: No scleral icterus.     Extraocular Movements: Extraocular movements intact.      Conjunctiva/sclera: Conjunctivae normal.      Pupils: Pupils are equal, round, and reactive to light.   Cardiovascular:      Rate and Rhythm: Normal rate and regular rhythm.      Pulses: Normal pulses.      Heart sounds: Normal heart sounds.   Pulmonary:      Effort: Pulmonary effort is normal. No respiratory distress.      Breath sounds: Normal breath sounds. No wheezing.   Abdominal:      General: Abdomen is flat. Bowel sounds are normal.      Palpations: Abdomen is soft.      Tenderness: There is no abdominal tenderness.   Musculoskeletal:         General: Normal range of motion.      Cervical back: Normal range of motion.      Right lower leg: No edema.      Left lower leg: No edema.   Skin:     General: Skin is warm and dry.      Findings: No erythema or rash.   Neurological:      General: No focal deficit present.      Mental Status: She is alert and oriented to person, place, and time. Mental " status is at baseline.      Motor: No weakness.   Psychiatric:         Mood and Affect: Mood normal.         Behavior: Behavior normal.         Thought Content: Thought content normal.         Judgment: Judgment normal.                Advance Care Planning   ACP discussion was held with the patient during this visit.         Results Reviewed:  Glucose   Date Value Ref Range Status   09/01/2023 75 70 - 99 mg/dL Final   08/26/2021 81 74 - 109 mg/dL Final     BUN   Date Value Ref Range Status   09/01/2023 10 6 - 20 mg/dL Final   08/26/2021 12 6 - 20 mg/dL Final     Creatinine   Date Value Ref Range Status   09/01/2023 0.71 0.57 - 1.00 mg/dL Final   08/26/2021 0.8 0.5 - 0.9 mg/dL Final     Sodium   Date Value Ref Range Status   09/01/2023 139 134 - 144 mmol/L Final   08/26/2021 139 136 - 145 mmol/L Final     Potassium   Date Value Ref Range Status   09/01/2023 4.2 3.5 - 5.2 mmol/L Final   08/26/2021 3.7 3.5 - 5.0 mmol/L Final     Chloride   Date Value Ref Range Status   09/01/2023 103 96 - 106 mmol/L Final   08/26/2021 106 98 - 111 mmol/L Final     CO2   Date Value Ref Range Status   08/26/2021 24 22 - 29 mmol/L Final     Total CO2   Date Value Ref Range Status   09/01/2023 23 20 - 29 mmol/L Final     Calcium   Date Value Ref Range Status   09/01/2023 9.1 8.7 - 10.2 mg/dL Final   08/26/2021 8.9 8.6 - 10.0 mg/dL Final     ALT (SGPT)   Date Value Ref Range Status   09/01/2023 12 0 - 32 IU/L Final   08/26/2021 9 5 - 33 U/L Final     AST (SGOT)   Date Value Ref Range Status   09/01/2023 13 0 - 40 IU/L Final   08/26/2021 12 5 - 32 U/L Final     WBC   Date Value Ref Range Status   09/01/2023 4.6 3.4 - 10.8 x10E3/uL Final   08/26/2021 7.2 4.8 - 10.8 K/uL Final     Hematocrit   Date Value Ref Range Status   09/01/2023 39.4 34.0 - 46.6 % Final   08/26/2021 38.6 37.0 - 47.0 % Final     Platelets   Date Value Ref Range Status   09/01/2023 269 150 - 450 x10E3/uL Final   08/26/2021 217 130 - 400 K/uL Final     Triglycerides   Date  Value Ref Range Status   09/01/2023 43 0 - 149 mg/dL Final     HDL Cholesterol   Date Value Ref Range Status   09/01/2023 71 >39 mg/dL Final     LDL Chol Calc ([facility])   Date Value Ref Range Status   09/01/2023 86 0 - 99 mg/dL Final         Assessment / Plan     Assessment/Plan:    1. Chronic throat clearing  - Food Allergy Profile  - CBC w AUTO Differential  - Comprehensive metabolic panel  - pantoprazole (Protonix) 40 MG EC tablet; Take 1 tablet by mouth Daily.  Dispense: 30 tablet; Refill: 0  - CT Soft Tissue Neck With & Without Contrast; Future    2. Chronic fatigue  - T4  - TSH  - CBC w AUTO Differential  - Comprehensive metabolic panel    Diagnoses and all orders for this visit:    1. Chronic throat clearing (Primary)  -     Food Allergy Profile  -     CBC w AUTO Differential  -     Comprehensive metabolic panel  -     pantoprazole (Protonix) 40 MG EC tablet; Take 1 tablet by mouth Daily.  Dispense: 30 tablet; Refill: 0  -     CT Soft Tissue Neck With & Without Contrast; Future    2. Chronic fatigue  -     T4  -     TSH  -     CBC w AUTO Differential  -     Comprehensive metabolic panel    3. Frequent headaches  -     rimegepant sulfate ODT (Nurtec) 75 MG tablet; Place 1 tablet under the tongue Daily As Needed (headaches).  Dispense: 6 tablet; Refill: 0        Assessment & Plan  1. Pharyngitis.  The patient's symptoms may be indicative of postnasal drip, despite the absence of congestion. The CT scan of her head did not reveal any significant sinus congestion. She has been experiencing persistent throat symptoms, including frequent coughing and throat clearing. She reports a sensation of her throat either constricting or having a foreign body lodged within it, despite the absence of any soreness. She has observed a red spot in her throat during oral hygiene practices. She experiences constant thirst and feels dehydrated even with excessive water intake. She had influenza infection for 2.5 weeks after [place],  which was a challenging period. Since then, she has been experiencing persistent throat symptoms, including frequent coughing and throat clearing. She recalls an episode of dyspnea at night, which was alleviated by sitting upright, consuming water and a banana. She reports no significant nasal drainage. She suspects a possible association between her symptoms and dairy consumption, as they seem to exacerbate following intake of dairy products. She also reports severe headaches lasting 2 to 3 days, which are unresponsive to Tylenol and ibuprofen. She has tried Nurtec, which provided temporary relief, but the headaches recurred after 3 to 4 days. She has also tried essential oils, which provided some relief. She does not believe her headaches are related to her throat symptoms. She has sought ophthalmological evaluation for her headaches, but no changes were noted. She describes a sensation of swelling in her throat, both externally and internally. She has attempted self-treatment with over-the-counter allergy medications without success. She has also tried Delsym, which provided temporary relief from her cough. She has been using 800 mg of ibuprofen for her headaches, but it has not been effective. A comprehensive lab workup will be conducted today, including thyroid function tests, food allergy panel, and inflammatory markers. A prescription for Protonix has been issued, to be taken once daily in the morning, 30 minutes prior to any food or drink intake. A CT scan with contrast of the pharynx will be ordered to rule out any potential obstructions. If the lab results return negative, a referral to an ENT specialist will be considered. If the ENT specialist's evaluation is inconclusive, a referral to a GI specialist may be necessary for further investigation, including a possible endoscopy.    2. Headaches.  The patient reports persistent headaches lasting 2 to 3 days, which have not improved with Tylenol or  ibuprofen. She has tried Nurtec, which provided temporary relief, but the headaches recurred within a week. Samples of Nurtec will be provided for her to take daily, which may help decrease the frequency of headaches over time.    Return for Annual physical. unless patient needs to be seen sooner or acute issues arise.      I have discussed the patient results/orders and and plan/recommendation with them at today's visit.    Patient or patient representative verbalized consent for the use of Ambient Listening during the visit with  BUFFY Barnes for chart documentation. 1/30/2025  14:02 CST  BUFFY Barnes   01/28/2025

## 2025-01-30 DIAGNOSIS — G43.119 INTRACTABLE MIGRAINE WITH AURA WITHOUT STATUS MIGRAINOSUS: Primary | ICD-10-CM

## 2025-01-31 LAB
ALBUMIN SERPL-MCNC: 4.8 G/DL (ref 3.9–4.9)
ALP SERPL-CCNC: 63 IU/L (ref 44–121)
ALT SERPL-CCNC: 14 IU/L (ref 0–32)
AST SERPL-CCNC: 14 IU/L (ref 0–40)
BASOPHILS # BLD AUTO: 0 X10E3/UL (ref 0–0.2)
BASOPHILS NFR BLD AUTO: 1 %
BILIRUB SERPL-MCNC: 0.5 MG/DL (ref 0–1.2)
BUN SERPL-MCNC: 13 MG/DL (ref 6–20)
BUN/CREAT SERPL: 18 (ref 9–23)
CALCIUM SERPL-MCNC: 9.3 MG/DL (ref 8.7–10.2)
CHLORIDE SERPL-SCNC: 102 MMOL/L (ref 96–106)
CLAM IGE QN: <0.1 KU/L
CO2 SERPL-SCNC: 21 MMOL/L (ref 20–29)
CODFISH IGE QN: <0.1 KU/L
CONV CLASS DESCRIPTION: NORMAL
CORN IGE QN: <0.1 KU/L
COW MILK IGE QN: <0.1 KU/L
CREAT SERPL-MCNC: 0.74 MG/DL (ref 0.57–1)
EGFRCR SERPLBLD CKD-EPI 2021: 111 ML/MIN/1.73
EGG WHITE IGE QN: <0.1 KU/L
EOSINOPHIL # BLD AUTO: 0.1 X10E3/UL (ref 0–0.4)
EOSINOPHIL NFR BLD AUTO: 2 %
ERYTHROCYTE [DISTWIDTH] IN BLOOD BY AUTOMATED COUNT: 13 % (ref 11.7–15.4)
GLOBULIN SER CALC-MCNC: 2.5 G/DL (ref 1.5–4.5)
GLUCOSE SERPL-MCNC: 74 MG/DL (ref 70–99)
HCT VFR BLD AUTO: 39.2 % (ref 34–46.6)
HGB BLD-MCNC: 13.2 G/DL (ref 11.1–15.9)
IMM GRANULOCYTES # BLD AUTO: 0 X10E3/UL (ref 0–0.1)
IMM GRANULOCYTES NFR BLD AUTO: 0 %
LYMPHOCYTES # BLD AUTO: 1.7 X10E3/UL (ref 0.7–3.1)
LYMPHOCYTES NFR BLD AUTO: 36 %
MCH RBC QN AUTO: 32 PG (ref 26.6–33)
MCHC RBC AUTO-ENTMCNC: 33.7 G/DL (ref 31.5–35.7)
MCV RBC AUTO: 95 FL (ref 79–97)
MONOCYTES # BLD AUTO: 0.4 X10E3/UL (ref 0.1–0.9)
MONOCYTES NFR BLD AUTO: 8 %
NEUTROPHILS # BLD AUTO: 2.6 X10E3/UL (ref 1.4–7)
NEUTROPHILS NFR BLD AUTO: 53 %
PEANUT IGE QN: <0.1 KU/L
PLATELET # BLD AUTO: 291 X10E3/UL (ref 150–450)
POTASSIUM SERPL-SCNC: 4.2 MMOL/L (ref 3.5–5.2)
PROT SERPL-MCNC: 7.3 G/DL (ref 6–8.5)
RBC # BLD AUTO: 4.13 X10E6/UL (ref 3.77–5.28)
SCALLOP IGE QN: <0.1 KU/L
SESAME SEED IGE QN: <0.1 KU/L
SHRIMP IGE QN: <0.1 KU/L
SODIUM SERPL-SCNC: 137 MMOL/L (ref 134–144)
SOYBEAN IGE QN: <0.1 KU/L
T4 SERPL-MCNC: 9.2 UG/DL (ref 4.5–12)
TSH SERPL DL<=0.005 MIU/L-ACNC: 1.26 UIU/ML (ref 0.45–4.5)
WALNUT IGE QN: <0.1 KU/L
WBC # BLD AUTO: 4.8 X10E3/UL (ref 3.4–10.8)
WHEAT IGE QN: <0.1 KU/L

## 2025-02-03 ENCOUNTER — TELEPHONE (OUTPATIENT)
Dept: OBGYN CLINIC | Age: 30
End: 2025-02-03

## 2025-02-12 ENCOUNTER — TELEPHONE (OUTPATIENT)
Dept: INTERNAL MEDICINE | Facility: CLINIC | Age: 30
End: 2025-02-12
Payer: COMMERCIAL

## 2025-02-12 NOTE — TELEPHONE ENCOUNTER
JORDAN [facility] - [phone]  PATIENT HAS A CT SCHEDULED FOR TOMORROW FOR SOFT TISSUE NECK W/WO CONTRAST.    [facility] HAS NOT APPROVED YET.    PLEASE CALL JORDAN BEFORE 2 PM TODAY IF YOU WANT TO RESCHEDULE

## 2025-02-18 ENCOUNTER — OFFICE VISIT (OUTPATIENT)
Dept: INTERNAL MEDICINE | Facility: CLINIC | Age: 30
End: 2025-02-18
Payer: COMMERCIAL

## 2025-02-18 VITALS
OXYGEN SATURATION: 97 % | RESPIRATION RATE: 19 BRPM | HEIGHT: 68 IN | BODY MASS INDEX: 29.1 KG/M2 | WEIGHT: 192 LBS | DIASTOLIC BLOOD PRESSURE: 68 MMHG | SYSTOLIC BLOOD PRESSURE: 110 MMHG | HEART RATE: 87 BPM

## 2025-02-18 DIAGNOSIS — Z00.00 ROUTINE GENERAL MEDICAL EXAMINATION AT A HEALTH CARE FACILITY: ICD-10-CM

## 2025-02-18 DIAGNOSIS — Z23 NEED FOR TDAP VACCINATION: Primary | ICD-10-CM

## 2025-02-18 DIAGNOSIS — G43.119 INTRACTABLE MIGRAINE WITH AURA WITHOUT STATUS MIGRAINOSUS: ICD-10-CM

## 2025-02-18 DIAGNOSIS — E55.9 VITAMIN D DEFICIENCY: ICD-10-CM

## 2025-02-18 DIAGNOSIS — R09.89 CHRONIC THROAT CLEARING: ICD-10-CM

## 2025-02-18 DIAGNOSIS — R51.9 FREQUENT HEADACHES: ICD-10-CM

## 2025-02-18 DIAGNOSIS — E53.8 VITAMIN B12 DEFICIENCY: ICD-10-CM

## 2025-02-18 RX ORDER — TOPIRAMATE 25 MG/1
25 TABLET, FILM COATED ORAL DAILY
Qty: 60 TABLET | Refills: 0 | Status: SHIPPED | OUTPATIENT
Start: 2025-02-18

## 2025-02-18 RX ORDER — PANTOPRAZOLE SODIUM 40 MG/1
40 TABLET, DELAYED RELEASE ORAL DAILY
Qty: 90 TABLET | Refills: 1 | Status: SHIPPED | OUTPATIENT
Start: 2025-02-18

## 2025-02-18 NOTE — LETTER
[facility]  Vaccine Consent Form    Patient Name:  Traci Doan  Patient :  1995     Vaccine(s) Ordered    Tdap Vaccine Greater Than or Equal To 8yo IM        Screening Checklist  The following questions should be completed prior to vaccination. If you answer “yes” to any question, it does not necessarily mean you should not be vaccinated. It just means we may need to clarify or ask more questions. If a question is unclear, please ask your healthcare provider to explain it.    Yes No   Any fever or moderate to severe illness today (mild illness and/or antibiotic treatment are not contraindications)?     Do you have a history of a serious reaction to any previous vaccinations, such as anaphylaxis, encephalopathy within 7 days, Guillain-Athens syndrome within 6 weeks, seizure?     Have you received any live vaccine(s) (e.g MMR, TREVIN) or any other vaccines in the last month (to ensure duplicate doses aren't given)?     Do you have an anaphylactic allergy to latex (DTaP, DTaP-IPV, Hep A, Hep B, MenB, RV, Td, Tdap), baker’s yeast (Hep B, HPV), polysorbates (RSV, nirsevimab, PCV 20, Rotavirrus, Tdap, Shingrix), or gelatin (TREVIN, MMR)?     Do you have an anaphylactic allergy to neomycin (Rabies, TREVIN, MMR, IPV, Hep A), polymyxin B (IPV), or streptomycin (IPV)?      Any cancer, leukemia, AIDS, or other immune system disorder? (TREVIN, MMR, RV)     Do you have a parent, brother, or sister with an immune system problem (if immune competence of vaccine recipient clinically verified, can proceed)? (MMR, TREVIN)     Any recent steroid treatments for >2 weeks, chemotherapy, or radiation treatment? (TREVIN, MMR)     Have you received antibody-containing blood transfusions or IVIG in the past 11 months (recommended interval is dependent on product)? (MMR, TREVIN)     Have you taken antiviral drugs (acyclovir, famciclovir, valacyclovir for TREVIN) in the last 24 or 48 hours, respectively?      Are you pregnant or planning to become  "pregnant within 1 month? (TREVIN, MMR, HPV, IPV, MenB, Abrexvy; For Hep B- refer to Engerix-B; For RSV - Abrysvo is indicated for 32-36 weeks of pregnancy from September to January)     For infants, have you ever been told your child has had intussusception or a medical emergency involving obstruction of the intestine (Rotavirus)? If not for an infant, can skip this question.         *Ordering Physicians/APC should be consulted if \"yes\" is checked by the patient or guardian above.  I have received, read, and understand the Vaccine Information Statement (VIS) for each vaccine ordered.  I have considered my or my child's health status as well as the health status of my close contacts.  I have taken the opportunity to discuss my vaccine questions with my or my child's health care provider.   I have requested that the ordered vaccine(s) be given to me or my child.  I understand the benefits and risks of the vaccines.  I understand that I should remain in the clinic for 15 minutes after receiving the vaccine(s).  _________________________________________________________  Signature of Patient or Parent/Legal Guardian ____________________  Date   "

## 2025-02-18 NOTE — PROGRESS NOTES
Subjective     Chief Complaint   Patient presents with    Annual Exam       History of Present Illness  History of Present Illness  The patient presents for evaluation of dizziness, headaches, acid reflux, and weight management.    She reports a slight improvement in her dizziness and headaches, with the most recent episode occurring 2 days post her previous visit. This episode was characterized by severe symptoms including vomiting and a migraine that rendered her incapacitated for 3 days. Despite taking her prescribed medication, she experienced a shift in the location of her headache. However, subsequent episodes have been less severe and have responded well to medication within an hour. She also reports an episode that began at a [employer] and intensified during her drive home, but was alleviated within an hour of medication administration. She has not previously used Topamax.    She expresses a desire to lose 20 pounds. She maintains a moderate diet, although she admits to occasional indulgences. She identifies stress as a contributing factor to her weight gain. Her breakfast typically consists of protein yogurt. She does not consume energy drinks or sodas, having only tried Red Bull once. She maintains an active lifestyle, including regular morning walks.    She believes that the combination of allergy medication and throat medication has been beneficial, as she no longer experiences a sensation of throat closure. She still experiences occasional congestion, but it is significantly less severe than before. She is currently taking her acid reflux medication in the morning and is seeking a refill.    MEDICATIONS  Current: [facility]    Patient's PMR from outside medical facility reviewed and noted.    Review of Systems   Neurological:  Positive for headaches.        Otherwise complete ROS reviewed and negative except as mentioned in the HPI.    Past Medical History:   Past Medical History:   Diagnosis Date     Anxiety     Rheumatoid arthritis      Past Surgical History:History reviewed. No pertinent surgical history.  Social History:  reports that she has never smoked. She has never used smokeless tobacco. She reports that she does not drink alcohol and does not use drugs.    Family History: family history includes Diabetes in her mother; Hypertension in her father; Other in her mother.      Allergies:  No Known Allergies  Medications:  Prior to Admission medications    Medication Sig Start Date End Date Taking? Authorizing Provider   levocetirizine (XYZAL) 5 MG tablet Take 1 tablet by mouth Every Evening. 6/11/24  Yes Marlene Briseno APRN   ondansetron ODT (ZOFRAN-ODT) 4 MG disintegrating tablet Place 1 tablet on the tongue Every 8 (Eight) Hours As Needed for Nausea or Vomiting. 9/1/23  Yes Marlene Briseno APRN   pantoprazole (Protonix) 40 MG EC tablet Take 1 tablet by mouth Daily. 1/28/25  Yes Marlene Briseno APRN   rimegepant sulfate ODT (Nurtec) 75 MG tablet Place 1 tablet under the tongue Daily As Needed (headaches). 1/28/25  Yes Marlene Briseno APRN       LEONARDO:        PHQ-9 Depression Screening  Little interest or pleasure in doing things?     Feeling down, depressed, or hopeless?     PHQ-2 Total Score     Trouble falling or staying asleep, or sleeping too much?     Feeling tired or having little energy?     Poor appetite or overeating?     Feeling bad about yourself - or that you are a failure or have let yourself or your family down?     Trouble concentrating on things, such as reading the newspaper or watching television?     Moving or speaking so slowly that other people could have noticed? Or the opposite - being so fidgety or restless that you have been moving around a lot more than usual?     Thoughts that you would be better off dead, or of hurting yourself in some way?     PHQ-9 Total Score     If you checked off any problems, how difficult have these problems made it for you to do your work, take  "care of things at home, or get along with other people?         Objective     Vital Signs: /68 (BP Location: Right arm, Patient Position: Sitting, Cuff Size: Adult)   Pulse 87   Resp 19   Ht 172.1 cm (67.75\")   Wt 87.1 kg (192 lb)   SpO2 97%   BMI 29.41 kg/m²   Physical Exam  Vitals and nursing note reviewed.   Constitutional:       General: She is not in acute distress.     Appearance: Normal appearance. She is not ill-appearing.   HENT:      Head: Normocephalic and atraumatic.      Right Ear: External ear normal.      Left Ear: External ear normal.      Nose: Nose normal.      Mouth/Throat:      Mouth: Mucous membranes are moist.      Pharynx: No posterior oropharyngeal erythema.   Eyes:      General: No scleral icterus.     Extraocular Movements: Extraocular movements intact.      Conjunctiva/sclera: Conjunctivae normal.      Pupils: Pupils are equal, round, and reactive to light.   Cardiovascular:      Rate and Rhythm: Normal rate and regular rhythm.      Pulses: Normal pulses.      Heart sounds: Normal heart sounds.   Pulmonary:      Effort: Pulmonary effort is normal. No respiratory distress.      Breath sounds: Normal breath sounds. No wheezing.   Abdominal:      General: Abdomen is flat. Bowel sounds are normal.      Palpations: Abdomen is soft.      Tenderness: There is no abdominal tenderness.   Musculoskeletal:         General: Normal range of motion.      Cervical back: Normal range of motion.      Right lower leg: No edema.      Left lower leg: No edema.   Skin:     General: Skin is warm and dry.      Findings: No erythema or rash.   Neurological:      General: No focal deficit present.      Mental Status: She is alert and oriented to person, place, and time. Mental status is at baseline.      Motor: No weakness.   Psychiatric:         Mood and Affect: Mood normal.         Behavior: Behavior normal.         Thought Content: Thought content normal.         Judgment: Judgment normal.        "       Advance Care Planning   ACP discussion was held with the patient during this visit.         Results Reviewed:  Glucose   Date Value Ref Range Status   01/28/2025 74 70 - 99 mg/dL Final   08/26/2021 81 74 - 109 mg/dL Final     BUN   Date Value Ref Range Status   01/28/2025 13 6 - 20 mg/dL Final   08/26/2021 12 6 - 20 mg/dL Final     Creatinine   Date Value Ref Range Status   01/28/2025 0.74 0.57 - 1.00 mg/dL Final   08/26/2021 0.8 0.5 - 0.9 mg/dL Final     Sodium   Date Value Ref Range Status   01/28/2025 137 134 - 144 mmol/L Final   08/26/2021 139 136 - 145 mmol/L Final     Potassium   Date Value Ref Range Status   01/28/2025 4.2 3.5 - 5.2 mmol/L Final   08/26/2021 3.7 3.5 - 5.0 mmol/L Final     Chloride   Date Value Ref Range Status   01/28/2025 102 96 - 106 mmol/L Final   08/26/2021 106 98 - 111 mmol/L Final     CO2   Date Value Ref Range Status   08/26/2021 24 22 - 29 mmol/L Final     Total CO2   Date Value Ref Range Status   01/28/2025 21 20 - 29 mmol/L Final     Calcium   Date Value Ref Range Status   01/28/2025 9.3 8.7 - 10.2 mg/dL Final   08/26/2021 8.9 8.6 - 10.0 mg/dL Final     ALT (SGPT)   Date Value Ref Range Status   01/28/2025 14 0 - 32 IU/L Final   08/26/2021 9 5 - 33 U/L Final     AST (SGOT)   Date Value Ref Range Status   01/28/2025 14 0 - 40 IU/L Final   08/26/2021 12 5 - 32 U/L Final     WBC   Date Value Ref Range Status   01/28/2025 4.8 3.4 - 10.8 x10E3/uL Final   08/26/2021 7.2 4.8 - 10.8 K/uL Final     Hematocrit   Date Value Ref Range Status   01/28/2025 39.2 34.0 - 46.6 % Final   08/26/2021 38.6 37.0 - 47.0 % Final     Platelets   Date Value Ref Range Status   01/28/2025 291 150 - 450 x10E3/uL Final   08/26/2021 217 130 - 400 K/uL Final     Triglycerides   Date Value Ref Range Status   09/01/2023 43 0 - 149 mg/dL Final     HDL Cholesterol   Date Value Ref Range Status   09/01/2023 71 >39 mg/dL Final     LDL Chol Calc ([facility])   Date Value Ref Range Status   09/01/2023 86 0 - 99 mg/dL Final          Assessment / Plan     Assessment/Plan:    1. Need for Tdap vaccination  - Tdap Vaccine Greater Than or Equal To 6yo IM    2. Chronic throat clearing  - pantoprazole (Protonix) 40 MG EC tablet; Take 1 tablet by mouth Daily.  Dispense: 90 tablet; Refill: 1    3. Routine general medical examination at a health care facility  - Lipid panel    4. Vitamin B12 deficiency  - Vitamin B12    5. Vitamin D deficiency    - Vitamin D 25 hydroxy    6. Intractable migraine with aura without status migrainosus    - topiramate (Topamax) 25 MG tablet; Take 1 tablet by mouth Daily.  Dispense: 60 tablet; Refill: 0    Diagnoses and all orders for this visit:    1. Need for Tdap vaccination (Primary)  -     Tdap Vaccine Greater Than or Equal To 6yo IM    2. Chronic throat clearing  -     pantoprazole (Protonix) 40 MG EC tablet; Take 1 tablet by mouth Daily.  Dispense: 90 tablet; Refill: 1    3. Routine general medical examination at a health care facility  -     Lipid panel    4. Vitamin B12 deficiency  -     Vitamin B12    5. Vitamin D deficiency  -     Vitamin D 25 hydroxy    6. Intractable migraine with aura without status migrainosus  -     topiramate (Topamax) 25 MG tablet; Take 1 tablet by mouth Daily.  Dispense: 60 tablet; Refill: 0        Assessment & Plan  1. Dizziness and headaches.  She reports that her dizziness and headaches have improved but still occur occasionally. She has been taking her headache medication promptly, which has helped alleviate the symptoms within an hour. A prescription for Topamax 25 mg once daily has been provided, with instructions to increase the dosage to 50 mg after one week. She can continue taking her current headache medication as needed. A refill for her headache medication has also been issued.    2. Gastroesophageal reflux disease (GERD).  She continues to experience occasional congestion but reports improvement with her current medications. She is advised to take her acid reflux  medication first thing in the morning, followed by her allergy medication 30-45 minutes later. A refill for her acid reflux medication has been provided. Has improved her throat clearing    3. Weight management.  She expresses a desire to lose 20 pounds and has been mindful of her diet and physical activity. Topamax 25 mg once daily has been prescribed, with instructions to increase to 50 mg after one week. This medication may help with weight loss and also manage her headaches.    4. Health maintenance.  She is due for a Tdap vaccine, which will be administered today. Additionally, her cholesterol and vitamin levels will be assessed through laboratory tests.    Follow-up  The patient will follow up in 2 months.    No follow-ups on file. unless patient needs to be seen sooner or acute issues arise.      I have discussed the patient results/orders and and plan/recommendation with them at today's visit.    Patient or patient representative verbalized consent for the use of Ambient Listening during the visit with  BUFFY Barnes for chart documentation. 2/27/2025  13:15 CST  BUFFY Barnes   02/18/2025

## 2025-02-19 LAB
25(OH)D3+25(OH)D2 SERPL-MCNC: 23 NG/ML (ref 30–100)
CHOLEST SERPL-MCNC: 182 MG/DL (ref 100–199)
HDLC SERPL-MCNC: 70 MG/DL
LDLC SERPL CALC-MCNC: 105 MG/DL (ref 0–99)
TRIGL SERPL-MCNC: 34 MG/DL (ref 0–149)
VIT B12 SERPL-MCNC: 567 PG/ML (ref 232–1245)
VLDLC SERPL CALC-MCNC: 7 MG/DL (ref 5–40)

## 2025-03-19 DIAGNOSIS — G43.119 INTRACTABLE MIGRAINE WITH AURA WITHOUT STATUS MIGRAINOSUS: ICD-10-CM

## 2025-03-20 RX ORDER — TOPIRAMATE 25 MG/1
25 TABLET, FILM COATED ORAL DAILY
Qty: 60 TABLET | Refills: 0 | Status: SHIPPED | OUTPATIENT
Start: 2025-03-20

## 2025-03-24 ENCOUNTER — OFFICE VISIT (OUTPATIENT)
Dept: NEUROLOGY | Age: 30
End: 2025-03-24
Payer: COMMERCIAL

## 2025-03-24 VITALS
OXYGEN SATURATION: 98 % | BODY MASS INDEX: 29.4 KG/M2 | DIASTOLIC BLOOD PRESSURE: 86 MMHG | HEIGHT: 68 IN | WEIGHT: 194 LBS | SYSTOLIC BLOOD PRESSURE: 121 MMHG | HEART RATE: 80 BPM

## 2025-03-24 DIAGNOSIS — Z79.899 MEDICATION MANAGEMENT: ICD-10-CM

## 2025-03-24 DIAGNOSIS — G43.009 MIGRAINE WITHOUT AURA AND WITHOUT STATUS MIGRAINOSUS, NOT INTRACTABLE: Primary | ICD-10-CM

## 2025-03-24 DIAGNOSIS — R42 DIZZINESS: ICD-10-CM

## 2025-03-24 DIAGNOSIS — R51.9 NONINTRACTABLE HEADACHE, UNSPECIFIED CHRONICITY PATTERN, UNSPECIFIED HEADACHE TYPE: ICD-10-CM

## 2025-03-24 PROCEDURE — 99214 OFFICE O/P EST MOD 30 MIN: CPT | Performed by: NURSE PRACTITIONER

## 2025-03-24 RX ORDER — TOPIRAMATE 25 MG/1
25 TABLET, FILM COATED ORAL DAILY
COMMUNITY
Start: 2025-03-20 | End: 2025-03-24 | Stop reason: SDUPTHER

## 2025-03-24 RX ORDER — PANTOPRAZOLE SODIUM 40 MG/1
40 TABLET, DELAYED RELEASE ORAL DAILY
COMMUNITY
Start: 2025-02-18

## 2025-03-24 RX ORDER — RIMEGEPANT SULFATE 75 MG/75MG
75 TABLET, ORALLY DISINTEGRATING ORAL PRN
Qty: 3 TABLET | Refills: 0 | Status: SHIPPED | COMMUNITY
Start: 2025-03-24

## 2025-03-24 RX ORDER — TOPIRAMATE 25 MG/1
TABLET, FILM COATED ORAL
Qty: 120 TABLET | Refills: 3 | Status: SHIPPED | OUTPATIENT
Start: 2025-03-24

## 2025-03-24 RX ORDER — RIMEGEPANT SULFATE 75 MG/75MG
TABLET, ORALLY DISINTEGRATING ORAL
Qty: 8 TABLET | Refills: 3 | Status: SHIPPED | OUTPATIENT
Start: 2025-03-24

## 2025-03-24 NOTE — PROGRESS NOTES
[facility] NEUROLOGY:    Patient: Amy Lassiter   :  1995  Age:  30 y.o.  MRN:  [id number]  Today:  3/24/25    Provider: SJ Coffman    Chief Complaint:  Chief Complaint   Patient presents with    New Patient    Migraine     C/O migraines that started about ~2 years ago. Worsening over the last few months. Last migraine was about ~1 month ago.      History of Present Illness  Amy Lassiter is a 30 y.o. year old female here for evaluation of migraines.     She has experienced migraines for the past 2 to 3 years, with a gradual increase in severity.  A recent CT scan revealed no abnormalities, prompting a referral for an MRI. The most recent migraine episode occurred approximately a month ago, characterized by pain originating from the front of the face and radiating upwards. Associated symptoms include nausea, vomiting, photophobia, phonophobia, dizziness, and confusion during these episodes, which can last up to 3 days. A sensation of pressure is reported prior to the onset of a migraine.  She using Nurtec 75 mg ODT as needed with benefit.  She is on topiramate 50 mg daily, reducing the frequency of migraines, but smaller, more frequent episodes are now experienced. No major migraine has occurred since starting the increased dose of topiramate. Smaller episodes have been managed with ibuprofen 800 mg. Initially, no side effects from topiramate were reported, but tingling in the feet has recently been experienced. Sumatriptan was previously tried as needed but caused vomiting. Migraines typically occur during the day and prevent sleep, but do not wake from sleep. Bending down exacerbates migraines and occasionally causes dizziness. No exacerbation of migraines with coughing, sneezing, straining, laughing, or yelling is reported. Weather changes are a known trigger for migraines. No history of head or neck injuries is reported.  No family history of migraine disease.    SOCIAL HISTORY  Occupations:

## 2025-04-02 ENCOUNTER — HOSPITAL ENCOUNTER (OUTPATIENT)
Dept: MRI IMAGING | Age: 30
Discharge: HOME | End: 2025-04-02
Payer: COMMERCIAL

## 2025-04-02 ENCOUNTER — RESULTS FOLLOW-UP (OUTPATIENT)
Dept: NEUROLOGY | Age: 30
End: 2025-04-02

## 2025-04-02 DIAGNOSIS — R42 DIZZINESS: ICD-10-CM

## 2025-04-02 DIAGNOSIS — R51.9 NONINTRACTABLE HEADACHE, UNSPECIFIED CHRONICITY PATTERN, UNSPECIFIED HEADACHE TYPE: ICD-10-CM

## 2025-04-02 PROCEDURE — 6360000004 HC RX CONTRAST MEDICATION

## 2025-04-02 PROCEDURE — 70553 MRI BRAIN STEM W/O & W/DYE: CPT

## 2025-04-02 PROCEDURE — A9577 INJ MULTIHANCE: HCPCS

## 2025-04-02 RX ADMIN — GADOBENATE DIMEGLUMINE 18 ML: 529 INJECTION, SOLUTION INTRAVENOUS at 11:16

## 2025-04-09 ENCOUNTER — PATIENT MESSAGE (OUTPATIENT)
Dept: NEUROLOGY | Age: 30
End: 2025-04-09

## 2025-04-10 DIAGNOSIS — Z79.899 MEDICATION MANAGEMENT: ICD-10-CM

## 2025-04-10 DIAGNOSIS — G43.009 MIGRAINE WITHOUT AURA AND WITHOUT STATUS MIGRAINOSUS, NOT INTRACTABLE: Primary | ICD-10-CM

## 2025-04-10 RX ORDER — GALCANEZUMAB 120 MG/ML
120 INJECTION, SOLUTION SUBCUTANEOUS
Qty: 1 ADJUSTABLE DOSE PRE-FILLED PEN SYRINGE | Refills: 5 | Status: SHIPPED | OUTPATIENT
Start: 2025-04-10

## 2025-04-11 ENCOUNTER — CLINICAL DOCUMENTATION (OUTPATIENT)
Dept: NEUROLOGY | Age: 30
End: 2025-04-11

## 2025-04-11 NOTE — PROGRESS NOTES
Approved  PA Detail   Prior authorization approved  Payer: Margie Adyen and [facility] - Commercial Case ID: [id number]  Note from payer: PA Case: [id number], Status: Approved, Coverage Starts on: 4/11/2025 12:00:00 AM, Coverage Ends on: 4/11/2026 12:00:00 AM.  Approval Details    Authorized from April 11, 2025 to April 11, 2026  Electronic appeal: Not supported  View History     Notes     Time User Attachment    Attachment received from payer. 4/11/2025  9:03 AM Ramila Alvarez Outgoing Prescription Prior Authorization Response Document      Medication Being Authorized    Galcanezumab-gnlm (EMGALITY) 120 MG/ML SOAJ  Inject 120 mg into the skin every 30 days  Dispense: 1 Adjustable Dose Pre-filled Pen Syringe Refills: 5   Start: 4/10/2025   Class: Normal Diagnoses: Migraine without aura and without status migrainosus, not intractable; Medication management   This order has been released to its destination.  To be filled at: [facility] - [place], [place] - [address] - P [phone] - F [phone]

## 2025-04-14 ENCOUNTER — TELEPHONE (OUTPATIENT)
Dept: INTERNAL MEDICINE | Facility: CLINIC | Age: 30
End: 2025-04-14

## 2025-04-14 NOTE — TELEPHONE ENCOUNTER
Caller: Traci Doan    Relationship: Self    Best call back number: [phone]     What is the best time to reach you: ANYTIME    Who are you requesting to speak with (clinical staff, provider,  specific staff member): BUFFY MELENDEZ OR CLINICAL    What was the call regarding: PATIENT IS CALLING TO DISCUSS A FEW DIFFERENT ISSUES. SHE STATED SHE WENT TO THE NEUROLOGIST AND THEY WANT HER TO STOP TAKING  topiramate (Topamax) AND START A DIFFERENT MEDICATION WHICH IS A SHOT AND PATIENT DOES NOT LIKE NEEDLES.     PATIENT STATED THE NEUROLOGIST INCREASED THE  topiramate (Topamax)  TO 75 MG, SINCE THE INCREASE PATIENT HAS BEEN EXPERIENCING SIDE EFFECTS FROM THE MEDICATION (ONE BEING HAIR LOSS).     PLEASE CALL TO DISCUSS

## 2025-04-14 NOTE — TELEPHONE ENCOUNTER
Pt is scheduled to see you on 04/22/25.  Would you like for me to schedule her an appt sooner to discuss her issues?  See above message

## 2025-04-15 NOTE — TELEPHONE ENCOUNTER
I called pt and he she said that neurologist wanted her to stop the topamax and sent in an injectable medication but she is not comfortable doing an injection.  She has sent them a message and tried to call them about this but has not heard back from them.  I advised her that if she has not started the injection, she could just cut the topamax back to 50mg and continue nurtec PRN, then follow up with you on 04/22.  She will call you to let you know if she hears back from neuro.

## 2025-04-22 ENCOUNTER — OFFICE VISIT (OUTPATIENT)
Dept: INTERNAL MEDICINE | Facility: CLINIC | Age: 30
End: 2025-04-22
Payer: COMMERCIAL

## 2025-04-22 VITALS
RESPIRATION RATE: 18 BRPM | DIASTOLIC BLOOD PRESSURE: 76 MMHG | OXYGEN SATURATION: 99 % | HEART RATE: 77 BPM | BODY MASS INDEX: 28.11 KG/M2 | WEIGHT: 185.5 LBS | SYSTOLIC BLOOD PRESSURE: 110 MMHG | HEIGHT: 68 IN

## 2025-04-22 DIAGNOSIS — G43.119 INTRACTABLE MIGRAINE WITH AURA WITHOUT STATUS MIGRAINOSUS: Primary | ICD-10-CM

## 2025-04-22 PROCEDURE — 99214 OFFICE O/P EST MOD 30 MIN: CPT

## 2025-04-22 RX ORDER — GALCANEZUMAB 120 MG/ML
120 INJECTION, SOLUTION SUBCUTANEOUS
COMMUNITY
Start: 2025-04-10

## 2025-04-22 NOTE — PROGRESS NOTES
Subjective     Chief Complaint   Patient presents with    Migraine       Migraine    History of Present Illness  The patient presents for evaluation of migraines.    The chief complaint is migraines. She reports that her neurologist had previously increased her Topamax dosage to 3 times daily, but she has since discontinued its use due to the onset of hair loss. Emgality has been prescribed as an alternative treatment, but she has not yet started this medication due to reluctance towards self-administration of injections, citing a general aversion to needles. Currently, Nurtec is being used, with one dose remaining, and insurance approval for further refills is pending. Emotional instability is reported, which she attributes to the Topamax.     Information regarding cerebral tonsillar ectopy was received, and she was advised not to worry about it until her follow-up appointment in 07/2025. Improvement in throat symptoms has been observed, potentially due to the medication she is taking. Dairy products have been identified as a trigger for her symptoms, leading to their elimination from her diet. Consumption of ice cream or yogurt results in symptom exacerbation lasting up to 3 days. Weight loss has been experienced while on Topamax, along with increased fatigue. A particularly difficult week was recalled during which she felt unusually introspective and experienced memory lapses.    Patient's PMR from outside medical facility reviewed and noted.    Review of Systems   Constitutional:  Negative for chills, diaphoresis, fatigue and fever.   HENT:  Negative for congestion and sore throat.    Respiratory:  Negative for cough.    Cardiovascular:  Negative for chest pain.   Gastrointestinal:  Negative for abdominal pain, nausea and vomiting.   Genitourinary:  Negative for dysuria.   Musculoskeletal:  Negative for myalgias and neck pain.   Skin:  Negative for rash.   Neurological:  Positive for headaches. Negative  for weakness and numbness.        Otherwise complete ROS reviewed and negative except as mentioned in the HPI.    Past Medical History:   Past Medical History:   Diagnosis Date    Anxiety     Rheumatoid arthritis      Past Surgical History:History reviewed. No pertinent surgical history.  Social History:  reports that she has never smoked. She has never used smokeless tobacco. She reports that she does not drink alcohol and does not use drugs.    Family History: family history includes Diabetes in her mother; Hypertension in her father; Other in her mother.      Allergies:  No Known Allergies  Medications:  Prior to Admission medications    Medication Sig Start Date End Date Taking? Authorizing Provider   Emgality 120 MG/ML auto-injector pen Inject 1 mL under the skin into the appropriate area as directed. 4/10/25  Yes Provider, MD Yoana   levocetirizine (XYZAL) 5 MG tablet Take 1 tablet by mouth Every Evening. 6/11/24  Yes Marlene Briseno APRN   ondansetron ODT (ZOFRAN-ODT) 4 MG disintegrating tablet Place 1 tablet on the tongue Every 8 (Eight) Hours As Needed for Nausea or Vomiting. 9/1/23  Yes Marlene Briseno APRN   pantoprazole (Protonix) 40 MG EC tablet Take 1 tablet by mouth Daily. 2/18/25  Yes Marlene Briseno APRN   rimegepant sulfate ODT (Nurtec) 75 MG disintegrating tablet Place 1 tablet under the tongue Daily As Needed (headaches). 2/18/25  Yes Marlene Briseno APRN   topiramate (Topamax) 25 MG tablet Take 1 tablet by mouth Daily.  Patient not taking: Reported on 4/22/2025 3/20/25 4/22/25  Marlene Briseno APRN       LEONARDO:        PHQ-9 Depression Screening  Little interest or pleasure in doing things?     Feeling down, depressed, or hopeless?     PHQ-2 Total Score     Trouble falling or staying asleep, or sleeping too much?     Feeling tired or having little energy?     Poor appetite or overeating?     Feeling bad about yourself - or that you are a failure or have let yourself or your family  "down?     Trouble concentrating on things, such as reading the newspaper or watching television?     Moving or speaking so slowly that other people could have noticed? Or the opposite - being so fidgety or restless that you have been moving around a lot more than usual?     Thoughts that you would be better off dead, or of hurting yourself in some way?     PHQ-9 Total Score     If you checked off any problems, how difficult have these problems made it for you to do your work, take care of things at home, or get along with other people?         Objective     Vital Signs: /76 (BP Location: Left arm, Patient Position: Sitting, Cuff Size: Adult)   Pulse 77   Resp 18   Ht 172.1 cm (67.75\")   Wt 84.1 kg (185 lb 8 oz)   SpO2 99%   BMI 28.41 kg/m²   Physical Exam  Vitals and nursing note reviewed.   Constitutional:       General: She is not in acute distress.     Appearance: Normal appearance. She is not ill-appearing.   HENT:      Head: Normocephalic and atraumatic.      Nose: Nose normal.      Mouth/Throat:      Mouth: Mucous membranes are moist.      Pharynx: No posterior oropharyngeal erythema.   Eyes:      General: No scleral icterus.     Extraocular Movements: Extraocular movements intact.      Conjunctiva/sclera: Conjunctivae normal.      Pupils: Pupils are equal, round, and reactive to light.   Cardiovascular:      Rate and Rhythm: Normal rate and regular rhythm.      Pulses: Normal pulses.      Heart sounds: Normal heart sounds.   Pulmonary:      Effort: Pulmonary effort is normal. No respiratory distress.      Breath sounds: Normal breath sounds. No wheezing.   Abdominal:      General: Abdomen is flat. Bowel sounds are normal.      Palpations: Abdomen is soft.      Tenderness: There is no abdominal tenderness.   Musculoskeletal:         General: Normal range of motion.      Cervical back: Normal range of motion.      Right lower leg: No edema.      Left lower leg: No edema.   Skin:     General: Skin is " warm and dry.      Findings: No erythema or rash.   Neurological:      General: No focal deficit present.      Mental Status: She is alert and oriented to person, place, and time. Mental status is at baseline.      Motor: No weakness.   Psychiatric:         Mood and Affect: Mood normal.         Behavior: Behavior normal.         Thought Content: Thought content normal.         Judgment: Judgment normal.                Advance Care Planning   ACP discussion was held with the patient during this visit.         Results Reviewed:  Glucose   Date Value Ref Range Status   01/28/2025 74 70 - 99 mg/dL Final   08/26/2021 81 74 - 109 mg/dL Final     BUN   Date Value Ref Range Status   01/28/2025 13 6 - 20 mg/dL Final   08/26/2021 12 6 - 20 mg/dL Final     Creatinine   Date Value Ref Range Status   01/28/2025 0.74 0.57 - 1.00 mg/dL Final   08/26/2021 0.8 0.5 - 0.9 mg/dL Final     Sodium   Date Value Ref Range Status   01/28/2025 137 134 - 144 mmol/L Final   08/26/2021 139 136 - 145 mmol/L Final     Potassium   Date Value Ref Range Status   01/28/2025 4.2 3.5 - 5.2 mmol/L Final   08/26/2021 3.7 3.5 - 5.0 mmol/L Final     Chloride   Date Value Ref Range Status   01/28/2025 102 96 - 106 mmol/L Final   08/26/2021 106 98 - 111 mmol/L Final     CO2   Date Value Ref Range Status   08/26/2021 24 22 - 29 mmol/L Final     Total CO2   Date Value Ref Range Status   01/28/2025 21 20 - 29 mmol/L Final     Calcium   Date Value Ref Range Status   01/28/2025 9.3 8.7 - 10.2 mg/dL Final   08/26/2021 8.9 8.6 - 10.0 mg/dL Final     ALT (SGPT)   Date Value Ref Range Status   01/28/2025 14 0 - 32 IU/L Final   08/26/2021 9 5 - 33 U/L Final     AST (SGOT)   Date Value Ref Range Status   01/28/2025 14 0 - 40 IU/L Final   08/26/2021 12 5 - 32 U/L Final     WBC   Date Value Ref Range Status   01/28/2025 4.8 3.4 - 10.8 x10E3/uL Final   08/26/2021 7.2 4.8 - 10.8 K/uL Final     Hematocrit   Date Value Ref Range Status   01/28/2025 39.2 34.0 - 46.6 % Final    08/26/2021 38.6 37.0 - 47.0 % Final     Platelets   Date Value Ref Range Status   01/28/2025 291 150 - 450 x10E3/uL Final   08/26/2021 217 130 - 400 K/uL Final     Triglycerides   Date Value Ref Range Status   02/18/2025 34 0 - 149 mg/dL Final     HDL Cholesterol   Date Value Ref Range Status   02/18/2025 70 >39 mg/dL Final     LDL Chol Calc (NIH)   Date Value Ref Range Status   02/18/2025 105 (H) 0 - 99 mg/dL Final         Assessment / Plan     Assessment/Plan:    1. Intractable migraine with aura without status migrainosus    Diagnoses and all orders for this visit:    1. Intractable migraine with aura without status migrainosus (Primary)      Assessment & Plan  1. Migraines.  - Experiencing migraines and previously on Topamax, which caused undesirable side effects such as hair loss and emotional instability.  - Neurologist recommended discontinuing Topamax and starting Emgality.  - Concerns expressed about the needle for Emgality injections; explained that Emgality is a subcutaneous injection similar to insulin and that numbing cream can be used to ease the process.  - Advised to bring Emgality to the clinic for administration if uncomfortable doing it herself; discussed potential benefits of Emgality in managing migraines.    2. Cerebral Tonsillar Ectopy.  - MRI results indicated minimal cerebral tonsillar ectopy, described as sagging brain tissue.  - Patient advised not to worry about the condition until follow-up in July.  - Discussed that this condition is often congenital and can be associated with migraines.  - Reassured that it is not a significant concern and will be reviewed further during the follow-up appointment.    3. Chronic Throat Clearing.  - Chronic throat clearing seems to be improving with avoidance of dairy products.  - Patient reported that dairy exacerbates the throat clearing symptoms.  - Medication previously prescribed appears to be helping with symptoms.  - Advised to continue avoiding  dairy and monitor symptoms; further evaluation may be necessary if symptoms persist.      No follow-ups on file. unless patient needs to be seen sooner or acute issues arise.      I have discussed the patient results/orders and and plan/recommendation with them at today's visit.    Patient or patient representative verbalized consent for the use of Ambient Listening during the visit with  BUFFY Barnes for chart documentation. 4/22/2025  11:31 CDT  BUFFY Banres   04/22/2025

## 2025-07-09 NOTE — PATIENT INSTRUCTIONS
A Healthy Lifestyle: Care Instructions  A healthy lifestyle can help you feel good, have more energy, and stay at a weight that's healthy for you. You can share a healthy lifestyle with your friends and family. And you can do it on your own.    Eat meals with your friends or family. You could try cooking together.    Plan activities with other people. Go for a walk with a friend, try a free online fitness class, or join a sports league.    Eat a variety of healthy foods. These include fruits, vegetables, whole grains, low-fat dairy, and lean protein.    Choose healthy portions of food. You can use the Nutrition Facts label on food packages as a guide.    Eat more fruits and vegetables. You could add vegetables to sandwiches or add fruit to cereal.    Drink water when you are thirsty. Limit soda, juice, and sports drinks.    Try to exercise most days. Aim for at least 2½ hours of exercise each week.    Keep moving. Work in the garden or take your dog on a walk. Use the stairs instead of the elevator.    If you use tobacco or nicotine, try to quit. Ask your doctor about programs and medicines to help you quit.    Limit alcohol. Men should have no more than 2 drinks a day. Women should have no more than 1. For some people, no alcohol is the best choice.  Follow-up care is a key part of your treatment and safety. Be sure to make and go to all appointments, and call your doctor if you are having problems. It's also a good idea to know your test results and keep a list of the medicines you take.  Where can you learn more?  Go to https://www.Media[employer].net/patientEd and enter U807 to learn more about \"A Healthy Lifestyle: Care Instructions.\"  Current as of: August 6, 2023               Content Version: 14.0  © 9686-3208 [employer].   Care instructions adapted under license by [employer]. If you have questions about a medical condition or this instruction, always ask your healthcare professional.

## 2025-07-10 ENCOUNTER — OFFICE VISIT (OUTPATIENT)
Dept: OBGYN CLINIC | Age: 30
End: 2025-07-10
Payer: COMMERCIAL

## 2025-07-10 VITALS
BODY MASS INDEX: 29.16 KG/M2 | WEIGHT: 189 LBS | HEART RATE: 76 BPM | DIASTOLIC BLOOD PRESSURE: 70 MMHG | SYSTOLIC BLOOD PRESSURE: 101 MMHG

## 2025-07-10 DIAGNOSIS — F41.9 ANXIETY AND DEPRESSION: ICD-10-CM

## 2025-07-10 DIAGNOSIS — Z30.431 INTRAUTERINE DEVICE SURVEILLANCE: ICD-10-CM

## 2025-07-10 DIAGNOSIS — F32.A ANXIETY AND DEPRESSION: ICD-10-CM

## 2025-07-10 DIAGNOSIS — Z12.4 ENCOUNTER FOR SCREENING FOR CERVICAL CANCER: ICD-10-CM

## 2025-07-10 DIAGNOSIS — Z01.419 ENCOUNTER FOR CERVICAL PAP SMEAR WITH PELVIC EXAM: Primary | ICD-10-CM

## 2025-07-10 DIAGNOSIS — Z11.51 ENCOUNTER FOR SCREENING FOR HUMAN PAPILLOMAVIRUS (HPV): ICD-10-CM

## 2025-07-10 LAB — HPV16+18+H RISK 12 DNA SPEC-IMP: NORMAL

## 2025-07-10 PROCEDURE — 99395 PREV VISIT EST AGE 18-39: CPT | Performed by: ADVANCED PRACTICE MIDWIFE

## 2025-07-10 NOTE — PROGRESS NOTES
Pt presents today for pap smear and breast exam.    She would like to discuss status of IUD. Experiencing more frequent periods.     Last mammogram: never  Last pap smear: 2023, neg   Sexually active: Yes  Sexual preference: Male  Contraception: IUD  : 3  Para: 2  AB: 1  Last bone density: never   Last colonoscopy: never  Menarche: 12 yrs old  LMP: 2025  Menses: irregular     
     Mood and Affect: Mood normal.         Behavior: Behavior normal.         Thought Content: Thought content normal.         Judgment: Judgment normal.   Vitals and nursing note reviewed.          MEDICATIONS:  No orders of the defined types were placed in this encounter.      ORDERS:  No orders of the defined types were placed in this encounter.      PLAN:  1. Encounter for cervical Pap smear with pelvic exam  2. Encounter for screening for cervical cancer  3. Encounter for screening for human papillomavirus (HPV)  4. Intrauterine device surveillance  5. Anxiety and depression      DISCUSSION/RECOMMENDATIONS:  Order Mirena for replacement.       I Sis Maguire RN, am scribing for and in the presence of Rajani Savage CNM  7/10/25  9:07 AM CDT   I have seen and examined the patient independently.  I reviewed all laboratory and imaging studies that are relevant.  I have reviewed and made any appropriate changes to the HPI.    Electronically signed by SJ Silver CNM on 7/10/25  at 9:34 AM CDT

## 2025-07-14 ENCOUNTER — TELEPHONE (OUTPATIENT)
Dept: OBGYN CLINIC | Age: 30
End: 2025-07-14

## 2025-07-16 ENCOUNTER — OFFICE VISIT (OUTPATIENT)
Dept: NEUROLOGY | Age: 30
End: 2025-07-16
Payer: COMMERCIAL

## 2025-07-16 ENCOUNTER — CLINICAL DOCUMENTATION (OUTPATIENT)
Dept: NEUROLOGY | Age: 30
End: 2025-07-16

## 2025-07-16 VITALS
WEIGHT: 189 LBS | HEIGHT: 68 IN | HEART RATE: 80 BPM | DIASTOLIC BLOOD PRESSURE: 87 MMHG | SYSTOLIC BLOOD PRESSURE: 136 MMHG | OXYGEN SATURATION: 98 % | BODY MASS INDEX: 28.64 KG/M2

## 2025-07-16 DIAGNOSIS — Q04.8 CEREBELLAR TONSILLAR ECTOPIA (HCC): ICD-10-CM

## 2025-07-16 DIAGNOSIS — G43.009 MIGRAINE WITHOUT AURA AND WITHOUT STATUS MIGRAINOSUS, NOT INTRACTABLE: Primary | ICD-10-CM

## 2025-07-16 DIAGNOSIS — Z79.899 MEDICATION MANAGEMENT: ICD-10-CM

## 2025-07-16 PROCEDURE — 99214 OFFICE O/P EST MOD 30 MIN: CPT | Performed by: NURSE PRACTITIONER

## 2025-07-16 NOTE — PROGRESS NOTES
Amy Lassiter (Key: [id number])  PA Case ID #: [id number]  Need Help? Call us at [phone]  Status  Sent to Plan today  Drug  Nurtec 75MG dispersible tablets    Form  [employer] Electronic PA Form (2017 NCP[employer])

## 2025-07-16 NOTE — PROGRESS NOTES
[facility] NEUROLOGY:    Patient: Amy Lassiter   :  1995  Age:  30 y.o.  MRN:  [id number]  Today:  3/24/25    Provider: SJ Coffman    Chief Complaint:  Chief Complaint   Patient presents with    Follow-up    Migraine     Pt states migraines have been coming more often. Pt states after the emgality last month, she got the worse migraine ever.     Results     MRI Brain     History of Present Illness  Amy Lassiter is a 30 y.o. year old female here for follow-up of migraines.  At prior visit we had increased her Topamax which caused her to experience significant hair loss.  This was discontinued and she was started on Emgality injection, is on month four.  She does report Emgality injections are quite painful for her.  She has had decrease in severe migraines however she is still having frequent mild headaches.  She also notes that last month she had a very severe migraine that lasted for multiple days.  No changes to headache characteristics.  Using Nurtec for  with benefit although she is having trouble getting this filled through the pharmacy.  MRI brain completed with unilateral 2 mm cerebellar tonsillar ectopia, no acute pathology or concerning findings.    She has experienced migraines for the past 2 to 3 years, with a gradual increase in severity.  A recent CT scan revealed no abnormalities, prompting a referral for an MRI. The most recent migraine episode occurred approximately a month ago, characterized by pain originating from the front of the face and radiating upwards. Associated symptoms include nausea, vomiting, photophobia, phonophobia, dizziness, and confusion during these episodes, which can last up to 3 days. A sensation of pressure is reported prior to the onset of a migraine.  She using Nurtec 75 mg ODT as needed with benefit.  She is on topiramate 50 mg daily, reducing the frequency of migraines, but smaller, more frequent episodes are now experienced. No major migraine

## 2025-07-17 NOTE — PROGRESS NOTES
Amy Lassiter (Key: [id number])    [facility]. Turnaround time for review of a PA request is dependent upon insurance plan and can range from 24 hours to 5 calendar days.  You may close this dialog, return to your dashboard, and perform other tasks. To check for an update later, click on the \"Search Insurance Payer Detailed Status\" located in the upper right corner of your dashboard.  If this search option is not available or Jo[facility] has not replied to your request within this timeframe, please contact the number on the back of the patient's insurance card.

## 2025-07-24 RX ORDER — ATOGEPANT 60 MG/1
60 TABLET ORAL DAILY
Qty: 30 TABLET | Refills: 3 | Status: SHIPPED | OUTPATIENT
Start: 2025-07-24

## 2025-07-28 DIAGNOSIS — G43.009 MIGRAINE WITHOUT AURA AND WITHOUT STATUS MIGRAINOSUS, NOT INTRACTABLE: ICD-10-CM

## 2025-07-28 DIAGNOSIS — Z79.899 MEDICATION MANAGEMENT: ICD-10-CM

## 2025-07-28 NOTE — TELEPHONE ENCOUNTER
Requested Prescriptions     Pending Prescriptions Disp Refills    EMGALITY 120 MG/ML SOAJ [Pharmacy Med Name: EMGALITY 120 MG/ML SOAJ 120 Solution Auto-injector] 1 mL 5     Sig: Inject 120 mg into the skin every 30 days       Last Office Visit: 7/16/2025  Next Office Visit: 10/30/2025  Last Medication Refill:6/24/2025

## 2025-07-29 ENCOUNTER — PATIENT MESSAGE (OUTPATIENT)
Dept: OBGYN CLINIC | Age: 30
End: 2025-07-29

## 2025-07-29 RX ORDER — GALCANEZUMAB 120 MG/ML
120 INJECTION, SOLUTION SUBCUTANEOUS
Qty: 1 ML | Refills: 5 | Status: SHIPPED | OUTPATIENT
Start: 2025-07-29

## 2025-08-21 ENCOUNTER — PROCEDURE VISIT (OUTPATIENT)
Dept: OBGYN CLINIC | Age: 30
End: 2025-08-21
Payer: COMMERCIAL

## 2025-08-21 VITALS
HEIGHT: 68 IN | DIASTOLIC BLOOD PRESSURE: 76 MMHG | SYSTOLIC BLOOD PRESSURE: 108 MMHG | BODY MASS INDEX: 27.68 KG/M2 | WEIGHT: 182.6 LBS | HEART RATE: 84 BPM

## 2025-08-21 DIAGNOSIS — Z30.432 ENCOUNTER FOR IUD REMOVAL: ICD-10-CM

## 2025-08-21 DIAGNOSIS — Z30.430 ENCOUNTER FOR INSERTION OF MIRENA IUD: Primary | ICD-10-CM

## 2025-08-21 DIAGNOSIS — Z01.812 PRE-PROCEDURAL LABORATORY EXAMINATION: ICD-10-CM

## 2025-08-21 LAB
CONTROL: NORMAL
PREGNANCY TEST URINE, POC: NEGATIVE

## 2025-08-21 PROCEDURE — 58300 INSERT INTRAUTERINE DEVICE: CPT

## 2025-08-21 PROCEDURE — 81025 URINE PREGNANCY TEST: CPT

## 2025-08-21 PROCEDURE — 58301 REMOVE INTRAUTERINE DEVICE: CPT

## 2025-08-21 RX ORDER — HYDROCODONE BITARTRATE AND ACETAMINOPHEN 5; 325 MG/1; MG/1
1 TABLET ORAL EVERY 4 HOURS PRN
Qty: 2 TABLET | Refills: 0 | Status: SHIPPED | OUTPATIENT
Start: 2025-08-21 | End: 2025-08-22

## 2025-08-21 ASSESSMENT — ENCOUNTER SYMPTOMS
DIARRHEA: 0
RESPIRATORY NEGATIVE: 1
BACK PAIN: 0
CHEST TIGHTNESS: 0
SHORTNESS OF BREATH: 0
CONSTIPATION: 0
NAUSEA: 0
GASTROINTESTINAL NEGATIVE: 1
ABDOMINAL PAIN: 0
RECTAL PAIN: 0